# Patient Record
Sex: FEMALE | Race: WHITE | NOT HISPANIC OR LATINO | Employment: FULL TIME | URBAN - METROPOLITAN AREA
[De-identification: names, ages, dates, MRNs, and addresses within clinical notes are randomized per-mention and may not be internally consistent; named-entity substitution may affect disease eponyms.]

---

## 2017-02-22 ENCOUNTER — OFFICE VISIT (OUTPATIENT)
Dept: URGENT CARE | Facility: CLINIC | Age: 62
End: 2017-02-22
Payer: COMMERCIAL

## 2017-02-22 PROCEDURE — 99213 OFFICE O/P EST LOW 20 MIN: CPT

## 2021-04-08 DIAGNOSIS — Z23 ENCOUNTER FOR IMMUNIZATION: ICD-10-CM

## 2022-01-05 ENCOUNTER — TELEPHONE (OUTPATIENT)
Dept: NEUROLOGY | Facility: CLINIC | Age: 67
End: 2022-01-05
Payer: COMMERCIAL

## 2022-01-05 NOTE — TELEPHONE ENCOUNTER
Name: Asa Dukes   Phone: [phone]  Level/Region: lumbar  Referring: friend/patient of Dr. Schmidt's  MRI month/year: 1/2021   MRI location: [facility]  Other workup/treatment (surgery/EMG/injections): Yes, describe: Injection Lower back 2/2021, and 6/29/2021 Facet 3 holes bottom of spine done [facility] [place] with [facility] Specialist.    The patient was offered the next available appointment.  I informed her that we would collect outside records to facilitate her new patient appointment.

## 2022-01-18 ENCOUNTER — HOSPITAL ENCOUNTER (OUTPATIENT)
Dept: RADIOLOGY | Facility: HOSPITAL | Age: 67
Discharge: HOME | End: 2022-01-18
Attending: PHYSICIAN ASSISTANT
Payer: COMMERCIAL

## 2022-01-18 ENCOUNTER — OFFICE VISIT (OUTPATIENT)
Dept: NEUROSURGERY | Facility: CLINIC | Age: 67
End: 2022-01-18
Payer: COMMERCIAL

## 2022-01-18 VITALS — SYSTOLIC BLOOD PRESSURE: 122 MMHG | DIASTOLIC BLOOD PRESSURE: 80 MMHG | HEART RATE: 114 BPM | OXYGEN SATURATION: 98 %

## 2022-01-18 DIAGNOSIS — M47.816 LUMBAR SPONDYLOSIS: ICD-10-CM

## 2022-01-18 DIAGNOSIS — M47.816 LUMBAR SPONDYLOSIS: Primary | ICD-10-CM

## 2022-01-18 PROCEDURE — 99203 OFFICE O/P NEW LOW 30 MIN: CPT | Performed by: PHYSICIAN ASSISTANT

## 2022-01-18 PROCEDURE — 72114 X-RAY EXAM L-S SPINE BENDING: CPT

## 2022-01-18 RX ORDER — CYCLOBENZAPRINE HCL 5 MG
TABLET ORAL
COMMUNITY
Start: 2022-01-12

## 2022-01-18 RX ORDER — MELOXICAM 15 MG/1
15 TABLET ORAL DAILY PRN
COMMUNITY
Start: 2021-11-27

## 2022-01-18 RX ORDER — CLORAZEPATE DIPOTASSIUM 3.75 MG/1
3.75 TABLET ORAL
COMMUNITY
Start: 2021-12-27

## 2022-01-18 RX ORDER — METAXALONE 800 MG/1
800 TABLET ORAL 3 TIMES DAILY PRN
COMMUNITY
Start: 2022-01-14

## 2022-01-18 RX ORDER — OXYCODONE HYDROCHLORIDE 5 MG/1
5 TABLET ORAL
COMMUNITY
Start: 2021-12-08

## 2022-01-18 RX ORDER — GABAPENTIN 300 MG/1
CAPSULE ORAL
COMMUNITY
Start: 2021-12-29

## 2022-01-18 RX ORDER — TOPIRAMATE 25 MG/1
25 TABLET ORAL NIGHTLY
COMMUNITY
Start: 2021-12-27

## 2022-01-18 RX ORDER — MECLIZINE HYDROCHLORIDE 25 MG/1
TABLET ORAL
COMMUNITY
Start: 2022-01-12

## 2022-01-18 RX ORDER — ENALAPRIL MALEATE 10 MG/1
10 TABLET ORAL
COMMUNITY
Start: 2021-12-27

## 2022-01-18 RX ORDER — VENLAFAXINE HYDROCHLORIDE 37.5 MG/1
37.5 CAPSULE, EXTENDED RELEASE ORAL
COMMUNITY
Start: 2021-12-27

## 2022-01-18 RX ORDER — ENALAPRIL MALEATE 20 MG/1
TABLET ORAL
COMMUNITY
Start: 2022-01-12

## 2022-01-18 NOTE — PROGRESS NOTES
[facility]  Medical Office [address], [address]  [place] [address]  Phone: [phone]  Fax: [phone]        22      Re: Asa Dukes  : 1955      Chief Complaint:  Lumbosacral discomfort     History of Present Illness:   Asa Dukes is a 66 y.o. right handed female who presents for neurosurgical consultation for evaluation of her lumbosacral discomfort.    She has had lumbosacral discomfort since she had her left partial knee replacement in 2020. By November-2020 she started developing significant spasms in her low back. She does have extension of her pain into her bilateral gluteal regions but no further extension into the lower extremities. She said overall her pain has been worse in the right side. She does mention having a left knee discomfort that radiated around her knee but did not connect with her gluteal discomfort. This pain did improve with an injection.     Her pain vacillates between a 2 and 10 out of 10. Her average level of discomfort is a 8 out of 10.  Her pain is most significant when sitting. Standing helps her alleviate her pain to a degree.  She has associated tingling in her right anterior chin from her knee down into the dorsum of her foot. She had tried numerous conservative modalities. She has undergone physical therapy, numerous injections and oral medications. She takes skelaxin, oxycodone, and gabapentin for pain control.    She has had a variety of injections by Dr. Johnson. On 22 she had undergone a L3-L4 interlaminar epidural,. On 21 she had an L5-S1 interlaminar epidural. On 21 she had bilateral L3-S1 intra-articular facet joint injections. On  she had an interlaminar L2-L3 injection. She felt her facet injections in  helped her pain to the most significant degree and lasted approximately 4 months.     She is without additional features of  weakness, alterations of sensation, bladder or bowel dysfunction. She ambulates without the need for assistive device. She reports a moderate to severe level of impairment on her activities of daily living due to the aforementioned symptoms.     She has had two pregnancies with two natural child births    Medical History:  has no past medical history on file.    Surgical History:  has no past surgical history on file.    Family History: family history is not on file.  Family history non-contributory to neurological condition.    Social History:   Social History     Socioeconomic History   • Marital status:      Spouse name: Not on file   • Number of children: Not on file   • Years of education: Not on file   • Highest education level: Not on file   Occupational History   • Not on file   Tobacco Use   • Smoking status: Not on file   • Smokeless tobacco: Not on file   Substance and Sexual Activity   • Alcohol use: Not on file   • Drug use: Not on file   • Sexual activity: Not on file   Other Topics Concern   • Not on file   Social History Narrative   • Not on file     Social Determinants of Health     Financial Resource Strain: Not on file   Food Insecurity: Not on file   Transportation Needs: Not on file   Physical Activity: Not on file   Stress: Not on file   Social Connections: Not on file   Intimate Partner Violence: Not on file   Housing Stability: Not on file        Allergies: No Known Allergies    Medications:   Current Outpatient Medications   Medication Sig Dispense Refill   • clorazepate 3.75 mg tablet Take 3.75 mg by mouth once daily.     • cyclobenzaprine 5 mg tablet TAKE 1 TABLET BY MOUTH 3 TIMES DAILY as needed FOR MUSCLE SPASMS     • enalapril 10 mg tablet Take 10 mg by mouth once daily.     • enalapril 20 mg tablet TAKE 1 TABLET BY MOUTH BY MOUTH DAILY     • gabapentin 300 mg capsule TAKE 1 CAPSULE BY MOUTH EVERY DAY AT BEDTIME     • meclizine 25 mg tablet TAKE 1 TABLET BY MOUTH TWICE DAILY as  needed FOR dizziness     • meloxicam 15 mg tablet Take 15 mg by mouth daily as needed.     • metaxalone 800 mg tablet Take 800 mg by mouth 3 (three) times a day as needed.     • oxyCODONE 5 mg immediate release tablet Take 5 mg by mouth every 4 (four) hours.     • topiramate 25 mg tablet Take 25 mg by mouth nightly.     • venlafaxine XR 37.5 mg 24 hr capsule Take 37.5 mg by mouth 2 (two) times a day.       No current facility-administered medications for this visit.       Review of Systems: A 14 point review of systems was performed and aside from what is mentioned above is otherwise negative.    General physical examination:  The patient is well appearing female, sitting upright in her chair in no acute distress, she appears her stated age.  Her head is atraumatic, normocephalic. Her neck is supple without obvious adenopathy. Oral mucosa is moist. Her peripheral pulses are symmetric and palpable. Extremities without peripheral edema. Her breathing is normal and unlabored.     Vitals:    01/18/22 1309   BP: 122/80   Pulse: (!) 114   SpO2: 98%          Neurologic examination:  Mental status:  The patient is alert, attentive, and oriented. Speech is clear and fluent with good repetition, comprehension, and naming.  Remote and recent memory are normal as well as fund of knowledge.    Cranial nerves:  CN II: Visual fields are full to confrontation.  Pupils are equal and briskly reactive to light.   CN III, IV, VI: Extra-occular muscles are intact  CN V: Facial sensation is intact and equal in V1-V3 distributions bilaterally.   CN VII: Face is symmetric with normal eye closure and smile.  CN VIII: Hearing is normal to rubbing fingers  CN IX, X: Palate elevates symmetrically. Phonation is normal.  CN XI: Head turning and shoulder shrug are intact  CN XII: Tongue is midline with normal movements and no atrophy.    Motor:  There is no pronator drift.  Muscle bulk and tone are normal.    Deltoid Biceps Triceps Wrist ext Hand   Finger Spread Hip flexion Knee ext Dorsi-  flexion EHL Plantar Flexion   L 5 5 5 5 5 5 5 5 5 5 5   R 5 5 5 5 5 5 4+ PL 5 5 5 5     Reflexes:  Reflexes are 2+ and symmetric at the biceps, brachialis, triceps, patellar, and Achilli's. There is no Dumont's sign or ankle clonus.    Sensory:   Intact light touch, pinprick, and position sense, throughout all 4 extremities.    Coordination:  There is no dysmetria on finger-to-nose testing.  Romberg is absent.    Gait:  Gait is steady with normal steps.  Heel and toe walking are normal. Tandem gait is normal.    MSK: tenderness to palpation over the PSIS.  No pain with steps, minor discomfort with zena's maneuver bilaterally    Data Review:  X-rays of the lumbar spine without bending performed 1/12/22  There is moderate disc space narrowing at L3-L4 with vacuum disc. Severe disc space narrowing is present at L4-L5 with approximately 9 mm grade 1 anterolisthesis of L4 related to L5. There is mild narrowing of the disc space at L5-S1. No spondylolysis. Hypertrophic degenerative facet disease is present at L4-L5 and L5-S1. No fracture, subluxation, or bone lesion. Sacroiliac joints without erosion.    Impression: Severe degenerative disease at L4-L5 with grade 1 anterolisthesis. Degenerative disease is also present at L4-L5, L5-S1.          MR of the lumbar spine performed 3/19/21  Impression:   1. Grade 1 anterolisthesis of L4 over L5 with multifactorial moderate to severe spinal stenosis. There is neural foraminal narrowing and lateral recesses stenosis art this level, with abutent on bilateral exiting L4 nerve roots and impingement on the bilateral exiting L4 nerve roots and impingement on the bilateral traverse L5 nerve roots.  2. Right paracentral disc intrusion at L2-L3 impinges the traversing right L3 nerve root within the lateral recess.   Images were personally reviewed by myself and with the patient.    L1-L2: Disc bulge without substantial spinal canal  narrowing. There is mild left neural foraminal narrowing.    L2-L3: Approximately 3 mm retrolisthesis of L2 over L3, with disc bulge and a superimposed right paracentral disc protrusion. There is mild facet arthrosis and slight prominence of the ligamentum flavum. There is narrowing of the right lateral recess with impingement on the traversing right L3 nerve root there spinal canal is overall mildly narrowed. There is mild bilateral neural foraminal narrowing.    L3-L4: mild disc bulge, mild facet arthrosis and slight prominence of the ligamentum flavum, resulting in mild spinal canal narrowing. There is narrowing of the lateral recesses, greater on the left, with crowding of the traversing left L4 nerve root. There is mild to moderate bilateral neural foraminal narrowing.    L4-L5: approximately 7 mm anterolisthesis of L4 over L5, with disc bulge, advanced facet arthrosis and prominence of the ligamentum flavum. The spinal canal is moderately to severely narrowed. There is bilateral L5 lateral recess stenosis/impingement. There is moderate bilateral neural foraminal narrowing with abutment on the bilateral exiting L4 nerve roots.    L5-S1: Disc bulge and left sided facet arthrosis, without substantial spinal canal narrowing. No substantial neural foraminal narrowing.     Assessment and Plan:  In summary, Asa Dukes is a 66 y.o. female with progressive history of lumbosacral discomfort into gluteal region with associated numbness of her right anterior calf, dorsum of her foot. Her symptoms persist despite numerous conservative measures including physical therapy, injection based therapies in multimodal pain regimen so she was therefore referred to my attention.     Lumbar MR imaging demonstrates multilevel degenerative changes with a reported grade 1 anterolisthesis of L4 over L5.resulting in severe central and lateral recess stenosis. I have asked she obtain an upright lumbar x-rays with  flexion/extension to access for any dynamic instability.    The patient was counseled at length regarding natural history of degenerative lumbar, stenosis, radiculopathy, neurogenic claudication.  She was asked to continue with lifestyle modification, avoidance of excessive bending, twisting, overhead lifting.  In the interim should her symptomatology evolve or worsen, I have asked she return sooner for prompt reevaluation.    Thank you for referring Asa Dukes  to my attention.  Please feel free to contact me anytime if I can be of further assistance.    Lola Bundy PA-C     I spent 30 minutes on this date of service performing the following activities: obtaining history, performing examination, entering orders, documenting, preparing for visit, obtaining / reviewing records, providing counseling and education, independently reviewing study/studies, communicating results and coordinating care.

## 2022-01-21 ENCOUNTER — OFFICE VISIT (OUTPATIENT)
Dept: NEUROSURGERY | Facility: CLINIC | Age: 67
End: 2022-01-21
Payer: COMMERCIAL

## 2022-01-21 VITALS
HEART RATE: 84 BPM | DIASTOLIC BLOOD PRESSURE: 80 MMHG | OXYGEN SATURATION: 98 % | HEIGHT: 65 IN | BODY MASS INDEX: 31.99 KG/M2 | SYSTOLIC BLOOD PRESSURE: 128 MMHG | WEIGHT: 192 LBS

## 2022-01-21 DIAGNOSIS — M43.16 SPONDYLOLISTHESIS OF LUMBAR REGION: ICD-10-CM

## 2022-01-21 DIAGNOSIS — M47.816 LUMBAR SPONDYLOSIS: Primary | ICD-10-CM

## 2022-01-21 PROCEDURE — 99215 OFFICE O/P EST HI 40 MIN: CPT | Performed by: NEUROLOGICAL SURGERY

## 2022-01-21 PROCEDURE — 3008F BODY MASS INDEX DOCD: CPT | Performed by: NEUROLOGICAL SURGERY

## 2022-01-21 ASSESSMENT — PAIN SCALES - GENERAL: PAINLEVEL: 8

## 2022-01-21 NOTE — LETTER
2022     Flavio Bailey MD  [address]  [address]  [place] [facility] [id number]    Patient: Roseline Bray  YOB: 1955  Date of Visit: 2022      Dear Dr. Bailey:    Thank you for referring Roseline Bray to me for evaluation. Below are my notes for this consultation.    If you have questions, please do not hesitate to call me. I look forward to following your patient along with you.         Sincerely,        Gerry Schmidt MD        CC: Gerry Sy MD  2022  2:36 PM  Signed      [facility]  Medical Office [facility], [address]  [place] [address]  Phone: [phone]  Fax: [phone]        22      Re: Roseline Bray  : 1955      Chief Complaint:  Lumbosacral discomfort     History of Present Illness:   Roseline Bray is a 66 y.o. right handed female who presents in neurosurgical consultation for evaluation of her lumbosacral discomfort.  Her symptoms began in 2020 shortly after a left partial knee arthroplasty procedure.  At that time her pain was located principally in the lumbosacral region with extension into the gluteal, upper thigh area, more so on the right.  Her symptoms gradually progressed and she sought medical evaluation in early .    She was referred for multiple conservative modalities of care inclusive of lifestyle modification, medication management, rehabilitative exercise.  Her pain symptoms partially responded to these therapies.  Unfortunately she developed worsening right-sided radicular extension of pain into her posterolateral thigh, shin, dorsum of her foot.  MRI of the lumbosacral spine was obtained disclosing L4-L5 spondylolisthesis, stenosis.  She was referred for injection based procedures thereafter.  She underwent injections in April, , 2021.  She experienced only transient improvement.  Given her failure with  traditional modalities of care, she was referred to my attention in secondary consultation.    On interview today, she notes sharp stabbing lumbosacral pain which extends into the gluteal, proximal thigh region bilaterally.  Her pain will vacillate in severity between a 2-10 out of 10.  Her average level of discomfort is an 8 out of 10.  Postural activities can magnify her symptoms.  She has associated features of tingling in the right shin to the dorsum of her foot.  She however denies symptoms of south numbness, weakness, bowel, bladder, gait dysfunction.  She ambulates without the need for an assistive device.  She reports a moderate to severe level of impairment in her activities of daily living secondary to the aforementioned issues.      Medical History:  has no past medical history on file.    Surgical History:  has no past surgical history on file.    Family History: Family history non-contributory to neurological condition.    Social History:   Social History     Socioeconomic History   • Marital status:      Spouse name: None   • Number of children: None   • Years of education: None   • Highest education level: None   Occupational History   • None   Tobacco Use   • Smoking status: Never Smoker   • Smokeless tobacco: Never Used   Substance and Sexual Activity   • Alcohol use: None   • Drug use: None   • Sexual activity: None   Other Topics Concern   • None   Social History Narrative   • None     Social Determinants of Health     Financial Resource Strain: Not on file   Food Insecurity: Not on file   Transportation Needs: Not on file   Physical Activity: Not on file   Stress: Not on file   Social Connections: Not on file   Intimate Partner Violence: Not on file   Housing Stability: Not on file        Allergies: No Known Allergies    Medications:   Current Outpatient Medications   Medication Sig Dispense Refill   • clorazepate 3.75 mg tablet Take 3.75 mg by mouth once daily.     • cyclobenzaprine 5 mg  "tablet TAKE 1 TABLET BY MOUTH 3 TIMES DAILY as needed FOR MUSCLE SPASMS     • enalapril 10 mg tablet Take 10 mg by mouth once daily.     • enalapril 20 mg tablet TAKE 1 TABLET BY MOUTH BY MOUTH DAILY     • gabapentin 300 mg capsule TAKE 1 CAPSULE BY MOUTH EVERY DAY AT BEDTIME     • meclizine 25 mg tablet TAKE 1 TABLET BY MOUTH TWICE DAILY as needed FOR dizziness     • meloxicam 15 mg tablet Take 15 mg by mouth daily as needed.     • metaxalone 800 mg tablet Take 800 mg by mouth 3 (three) times a day as needed.     • oxyCODONE 5 mg immediate release tablet Take 5 mg by mouth every 4 (four) hours.     • topiramate 25 mg tablet Take 25 mg by mouth nightly.     • venlafaxine XR 37.5 mg 24 hr capsule Take 37.5 mg by mouth 2 (two) times a day.       No current facility-administered medications for this visit.       Review of Systems:   A 14 point review of systems was performed and aside from what is mentioned above is otherwise negative.    General physical examination:  The patient is well appearing female, sitting upright in her chair in no acute distress, she appears her stated age.  Her head is atraumatic, normocephalic. Her neck is supple without obvious adenopathy. Oral mucosa is moist. Her peripheral pulses are symmetric and palpable. Extremities without peripheral edema. Her breathing is normal and unlabored.     Vitals:    01/21/22 1102   BP: 128/80   BP Location: Left upper arm   Patient Position: Sitting   Pulse: 84   SpO2: 98%   Weight: 87.1 kg (192 lb)   Height: 1.651 m (5' 5\")          Neurologic examination:  Mental status:  The patient is alert, attentive, and oriented. Speech is clear and fluent with good repetition, comprehension, and naming.  Remote and recent memory are normal as well as fund of knowledge.    Cranial nerves:  CN II: Visual fields are full to confrontation.  Pupils are equal and briskly reactive to light.   CN III, IV, VI: Extra-occular muscles are intact  CN V: Facial sensation is " intact and equal in V1-V3 distributions bilaterally.   CN VII: Face is symmetric with normal eye closure and smile.  CN VIII: Hearing is normal to rubbing fingers  CN IX, X: Palate elevates symmetrically. Phonation is normal.  CN XI: Head turning and shoulder shrug are intact  CN XII: Tongue is midline with normal movements and no atrophy.    Motor:  There is no pronator drift.  Muscle bulk and tone are normal.    Deltoid Biceps Triceps Wrist ext Hand  Finger Spread Hip flexion Knee ext Dorsi-  flexion EHL Plantar Flexion   L 5 5 5 5 5 5 5 5 5 5 5   R 5 5 5 5 5 5 4+ PL 5 5 5 5     Reflexes:  Reflexes are 2+ and symmetric at the biceps, brachialis, triceps, patellar, and Achilli's. There is no Dumont's sign or ankle clonus.    Sensory:   Intact light touch, pinprick, and position sense, throughout all 4 extremities.    Coordination:  There is no dysmetria on finger-to-nose testing.  Romberg is absent.    Gait:  Gait is steady with normal steps.  Heel and toe walking are normal. Tandem gait is normal.    MSK: tenderness to palpation over the PSIS.  No pain with steps, minor discomfort with zena's maneuver bilaterally    Data Review:  X-RAY LUMBAR SPINE COMPLETE WITH BENDING (01/18/2022)    AP, lateral, bilateral oblique, and cone down views of the lumbar spine  demonstrate mild scoliosis of the lumbar spine. There are multilevel facet  hypertrophy and disc disease throughout the lumbar spine. There is grade 1  anterolisthesis of L4 on L5. On the flexion and extension views, there is  persistent grade 1 anterolisthesis of L4 on L5 without significant change  compared to prior study. No definite evidence of acute fracture or dislocation.  Impression: IMPRESSION:  Grade 1 anterolisthesis of L4 on L5 with multilevel disc disease and facet  hypertrophy. No acute fracture or dislocation.     X-rays of the lumbar spine without bending performed 1/12/22  There is moderate disc space narrowing at L3-L4 with vacuum disc.  Severe disc space narrowing is present at L4-L5 with approximately 9 mm grade 1 anterolisthesis of L4 related to L5. There is mild narrowing of the disc space at L5-S1. No spondylolysis. Hypertrophic degenerative facet disease is present at L4-L5 and L5-S1. No fracture, subluxation, or bone lesion. Sacroiliac joints without erosion.    Impression: Severe degenerative disease at L4-L5 with grade 1 anterolisthesis. Degenerative disease is also present at L4-L5, L5-S1.          MR of the lumbar spine performed 3/19/21  Impression:   1. Grade 1 anterolisthesis of L4 over L5 with multifactorial moderate to severe spinal stenosis. There is neural foraminal narrowing and lateral recesses stenosis art this level, with abutent on bilateral exiting L4 nerve roots and impingement on the bilateral exiting L4 nerve roots and impingement on the bilateral traverse L5 nerve roots.  2. Right paracentral disc intrusion at L2-L3 impinges the traversing right L3 nerve root within the lateral recess.   Images were personally reviewed by myself and with the patient.    L1-L2: Disc bulge without substantial spinal canal narrowing. There is mild left neural foraminal narrowing.    L2-L3: Approximately 3 mm retrolisthesis of L2 over L3, with disc bulge and a superimposed right paracentral disc protrusion. There is mild facet arthrosis and slight prominence of the ligamentum flavum. There is narrowing of the right lateral recess with impingement on the traversing right L3 nerve root there spinal canal is overall mildly narrowed. There is mild bilateral neural foraminal narrowing.    L3-L4: mild disc bulge, mild facet arthrosis and slight prominence of the ligamentum flavum, resulting in mild spinal canal narrowing. There is narrowing of the lateral recesses, greater on the left, with crowding of the traversing left L4 nerve root. There is mild to moderate bilateral neural foraminal narrowing.    L4-L5: approximately 7 mm anterolisthesis of  L4 over L5, with disc bulge, advanced facet arthrosis and prominence of the ligamentum flavum. The spinal canal is moderately to severely narrowed. There is bilateral L5 lateral recess stenosis/impingement. There is moderate bilateral neural foraminal narrowing with abutment on the bilateral exiting L4 nerve roots.    L5-S1: Disc bulge and left sided facet arthrosis, without substantial spinal canal narrowing. No substantial neural foraminal narrowing.     Assessment and Plan:    In summary, Roseline Bray is a 66 y.o. female with progressive history of lumbosacral, gluteal, right greater than left posterolateral thigh, foot discomfort.  Her symptoms are suggestive of L4, L5 radiculopathy.  She has concordant radiographic findings of high-grade L4-L5 stenosis on the basis of a grade 1-2 spondylolisthesis.  Upright flexion, extension x-rays of the lumbosacral spine failed to disclose hypermobility.    Her symptoms have escalated over the past 1 year despite traditional modalities of care.  The natural history of lumbar spondylosis, spondylolisthesis, stenosis was elaborated in great detail.  We have recommended she continue with lifestyle modification, core strengthening, symptomatic medication with anti-inflammatories, muscle relaxants, neuropathic medications (gabapentin, Lyrica), avoidance of excessive bending, twisting, or lifting, unrestrained forces across her axial skeleton.  Thereafter we look forward to seeing her return in follow-up consultation in 3 months time to reassess her progress.    Surgical intervention would be reserved for failure with the aforementioned treatment options. In the interim should her symptomatology evolve or worsen, I have asked she return sooner for prompt reevaluation.    Thank you for referring Roseline Bray  to my attention.  Please feel free to contact me anytime if I can be of further assistance.      I, Aaron Ulrich PA-C, am scribing for, and in the presence of  Gerry Schmidt MD.    I, Gerry Schmidt MD, personally performed the services described in this documentation as scribed by Aaron Ulrich PA-C  in my presence, and it is accurate and complete.       I spent 62 minutes on this date of service performing the following activities: obtaining history, performing examination, entering orders, documenting, preparing for visit, obtaining / reviewing records, providing counseling and education, independently reviewing study/studies, communicating results and coordinating care.

## 2022-01-21 NOTE — PROGRESS NOTES
[facility]  Medical Office [address], [address]  [place] [address]  Phone: [phone]  Fax: [phone]        22      Re: Roseline Bray  : 1955      Chief Complaint:  Lumbosacral discomfort     History of Present Illness:   Roseline Bray is a 66 y.o. right handed female who presents in neurosurgical consultation for evaluation of her lumbosacral discomfort.  Her symptoms began in 2020 shortly after a left partial knee arthroplasty procedure.  At that time her pain was located principally in the lumbosacral region with extension into the gluteal, upper thigh area, more so on the right.  Her symptoms gradually progressed and she sought medical evaluation in early .    She was referred for multiple conservative modalities of care inclusive of lifestyle modification, medication management, rehabilitative exercise.  Her pain symptoms partially responded to these therapies.  Unfortunately she developed worsening right-sided radicular extension of pain into her posterolateral thigh, shin, dorsum of her foot.  MRI of the lumbosacral spine was obtained disclosing L4-L5 spondylolisthesis, stenosis.  She was referred for injection based procedures thereafter.  She underwent injections in April, , 2021.  She experienced only transient improvement.  Given her failure with traditional modalities of care, she was referred to my attention in secondary consultation.    On interview today, she notes sharp stabbing lumbosacral pain which extends into the gluteal, proximal thigh region bilaterally.  Her pain will vacillate in severity between a 2-10 out of 10.  Her average level of discomfort is an 8 out of 10.  Postural activities can magnify her symptoms.  She has associated features of tingling in the right shin to the dorsum of her foot.  She however denies symptoms of south numbness, weakness, bowel, bladder, gait  dysfunction.  She ambulates without the need for an assistive device.  She reports a moderate to severe level of impairment in her activities of daily living secondary to the aforementioned issues.      Medical History:  has no past medical history on file.    Surgical History:  has no past surgical history on file.    Family History: Family history non-contributory to neurological condition.    Social History:   Social History     Socioeconomic History   • Marital status:      Spouse name: None   • Number of children: None   • Years of education: None   • Highest education level: None   Occupational History   • None   Tobacco Use   • Smoking status: Never Smoker   • Smokeless tobacco: Never Used   Substance and Sexual Activity   • Alcohol use: None   • Drug use: None   • Sexual activity: None   Other Topics Concern   • None   Social History Narrative   • None     Social Determinants of Health     Financial Resource Strain: Not on file   Food Insecurity: Not on file   Transportation Needs: Not on file   Physical Activity: Not on file   Stress: Not on file   Social Connections: Not on file   Intimate Partner Violence: Not on file   Housing Stability: Not on file        Allergies: No Known Allergies    Medications:   Current Outpatient Medications   Medication Sig Dispense Refill   • clorazepate 3.75 mg tablet Take 3.75 mg by mouth once daily.     • cyclobenzaprine 5 mg tablet TAKE 1 TABLET BY MOUTH 3 TIMES DAILY as needed FOR MUSCLE SPASMS     • enalapril 10 mg tablet Take 10 mg by mouth once daily.     • enalapril 20 mg tablet TAKE 1 TABLET BY MOUTH BY MOUTH DAILY     • gabapentin 300 mg capsule TAKE 1 CAPSULE BY MOUTH EVERY DAY AT BEDTIME     • meclizine 25 mg tablet TAKE 1 TABLET BY MOUTH TWICE DAILY as needed FOR dizziness     • meloxicam 15 mg tablet Take 15 mg by mouth daily as needed.     • metaxalone 800 mg tablet Take 800 mg by mouth 3 (three) times a day as needed.     • oxyCODONE 5 mg immediate  "release tablet Take 5 mg by mouth every 4 (four) hours.     • topiramate 25 mg tablet Take 25 mg by mouth nightly.     • venlafaxine XR 37.5 mg 24 hr capsule Take 37.5 mg by mouth 2 (two) times a day.       No current facility-administered medications for this visit.       Review of Systems:   A 14 point review of systems was performed and aside from what is mentioned above is otherwise negative.    General physical examination:  The patient is well appearing female, sitting upright in her chair in no acute distress, she appears her stated age.  Her head is atraumatic, normocephalic. Her neck is supple without obvious adenopathy. Oral mucosa is moist. Her peripheral pulses are symmetric and palpable. Extremities without peripheral edema. Her breathing is normal and unlabored.     Vitals:    01/21/22 1102   BP: 128/80   BP Location: Left upper arm   Patient Position: Sitting   Pulse: 84   SpO2: 98%   Weight: 87.1 kg (192 lb)   Height: 1.651 m (5' 5\")          Neurologic examination:  Mental status:  The patient is alert, attentive, and oriented. Speech is clear and fluent with good repetition, comprehension, and naming.  Remote and recent memory are normal as well as fund of knowledge.    Cranial nerves:  CN II: Visual fields are full to confrontation.  Pupils are equal and briskly reactive to light.   CN III, IV, VI: Extra-occular muscles are intact  CN V: Facial sensation is intact and equal in V1-V3 distributions bilaterally.   CN VII: Face is symmetric with normal eye closure and smile.  CN VIII: Hearing is normal to rubbing fingers  CN IX, X: Palate elevates symmetrically. Phonation is normal.  CN XI: Head turning and shoulder shrug are intact  CN XII: Tongue is midline with normal movements and no atrophy.    Motor:  There is no pronator drift.  Muscle bulk and tone are normal.    Deltoid Biceps Triceps Wrist ext Hand  Finger Spread Hip flexion Knee ext Dorsi-  flexion EHL Plantar Flexion   L 5 5 5 5 5 5 5 " 5 5 5 5   R 5 5 5 5 5 5 4+ PL 5 5 5 5     Reflexes:  Reflexes are 2+ and symmetric at the biceps, brachialis, triceps, patellar, and Achilli's. There is no Dumont's sign or ankle clonus.    Sensory:   Intact light touch, pinprick, and position sense, throughout all 4 extremities.    Coordination:  There is no dysmetria on finger-to-nose testing.  Romberg is absent.    Gait:  Gait is steady with normal steps.  Heel and toe walking are normal. Tandem gait is normal.    MSK: tenderness to palpation over the PSIS.  No pain with steps, minor discomfort with zena's maneuver bilaterally    Data Review:  X-RAY LUMBAR SPINE COMPLETE WITH BENDING (01/18/2022)    AP, lateral, bilateral oblique, and cone down views of the lumbar spine  demonstrate mild scoliosis of the lumbar spine. There are multilevel facet  hypertrophy and disc disease throughout the lumbar spine. There is grade 1  anterolisthesis of L4 on L5. On the flexion and extension views, there is  persistent grade 1 anterolisthesis of L4 on L5 without significant change  compared to prior study. No definite evidence of acute fracture or dislocation.  Impression: IMPRESSION:  Grade 1 anterolisthesis of L4 on L5 with multilevel disc disease and facet  hypertrophy. No acute fracture or dislocation.     X-rays of the lumbar spine without bending performed 1/12/22  There is moderate disc space narrowing at L3-L4 with vacuum disc. Severe disc space narrowing is present at L4-L5 with approximately 9 mm grade 1 anterolisthesis of L4 related to L5. There is mild narrowing of the disc space at L5-S1. No spondylolysis. Hypertrophic degenerative facet disease is present at L4-L5 and L5-S1. No fracture, subluxation, or bone lesion. Sacroiliac joints without erosion.    Impression: Severe degenerative disease at L4-L5 with grade 1 anterolisthesis. Degenerative disease is also present at L4-L5, L5-S1.          MR of the lumbar spine performed 3/19/21  Impression:   1. Grade 1  anterolisthesis of L4 over L5 with multifactorial moderate to severe spinal stenosis. There is neural foraminal narrowing and lateral recesses stenosis art this level, with abutent on bilateral exiting L4 nerve roots and impingement on the bilateral exiting L4 nerve roots and impingement on the bilateral traverse L5 nerve roots.  2. Right paracentral disc intrusion at L2-L3 impinges the traversing right L3 nerve root within the lateral recess.   Images were personally reviewed by myself and with the patient.    L1-L2: Disc bulge without substantial spinal canal narrowing. There is mild left neural foraminal narrowing.    L2-L3: Approximately 3 mm retrolisthesis of L2 over L3, with disc bulge and a superimposed right paracentral disc protrusion. There is mild facet arthrosis and slight prominence of the ligamentum flavum. There is narrowing of the right lateral recess with impingement on the traversing right L3 nerve root there spinal canal is overall mildly narrowed. There is mild bilateral neural foraminal narrowing.    L3-L4: mild disc bulge, mild facet arthrosis and slight prominence of the ligamentum flavum, resulting in mild spinal canal narrowing. There is narrowing of the lateral recesses, greater on the left, with crowding of the traversing left L4 nerve root. There is mild to moderate bilateral neural foraminal narrowing.    L4-L5: approximately 7 mm anterolisthesis of L4 over L5, with disc bulge, advanced facet arthrosis and prominence of the ligamentum flavum. The spinal canal is moderately to severely narrowed. There is bilateral L5 lateral recess stenosis/impingement. There is moderate bilateral neural foraminal narrowing with abutment on the bilateral exiting L4 nerve roots.    L5-S1: Disc bulge and left sided facet arthrosis, without substantial spinal canal narrowing. No substantial neural foraminal narrowing.     Assessment and Plan:    In summary, Roseline Bray is a 66 y.o. female with  progressive history of lumbosacral, gluteal, right greater than left posterolateral thigh, foot discomfort.  Her symptoms are suggestive of L4, L5 radiculopathy.  She has concordant radiographic findings of high-grade L4-L5 stenosis on the basis of a grade 1-2 spondylolisthesis.  Upright flexion, extension x-rays of the lumbosacral spine failed to disclose hypermobility.    Her symptoms have escalated over the past 1 year despite traditional modalities of care.  The natural history of lumbar spondylosis, spondylolisthesis, stenosis was elaborated in great detail.  We have recommended she continue with lifestyle modification, core strengthening, symptomatic medication with anti-inflammatories, muscle relaxants, neuropathic medications (gabapentin, Lyrica), avoidance of excessive bending, twisting, or lifting, unrestrained forces across her axial skeleton.  Thereafter we look forward to seeing her return in follow-up consultation in 3 months time to reassess her progress.    Surgical intervention would be reserved for failure with the aforementioned treatment options. In the interim should her symptomatology evolve or worsen, I have asked she return sooner for prompt reevaluation.    Thank you for referring Roseline Bray  to my attention.  Please feel free to contact me anytime if I can be of further assistance.      I, Aaron Ulrich PA-C, am scribing for, and in the presence of Gerry Schmidt MD.    I, Gerry Schmidt MD, personally performed the services described in this documentation as scribed by Aaron Ulrich PA-C  in my presence, and it is accurate and complete.       I spent 62 minutes on this date of service performing the following activities: obtaining history, performing examination, entering orders, documenting, preparing for visit, obtaining / reviewing records, providing counseling and education, independently reviewing study/studies, communicating results and coordinating care.

## 2022-04-20 ENCOUNTER — OFFICE VISIT (OUTPATIENT)
Dept: NEUROSURGERY | Facility: CLINIC | Age: 67
End: 2022-04-20
Payer: COMMERCIAL

## 2022-04-20 VITALS
HEIGHT: 65 IN | SYSTOLIC BLOOD PRESSURE: 142 MMHG | DIASTOLIC BLOOD PRESSURE: 94 MMHG | RESPIRATION RATE: 15 BRPM | WEIGHT: 186 LBS | BODY MASS INDEX: 30.99 KG/M2 | OXYGEN SATURATION: 94 % | HEART RATE: 92 BPM

## 2022-04-20 DIAGNOSIS — M43.16 SPONDYLOLISTHESIS OF LUMBAR REGION: ICD-10-CM

## 2022-04-20 DIAGNOSIS — M47.816 LUMBAR SPONDYLOSIS: Primary | ICD-10-CM

## 2022-04-20 PROCEDURE — 99214 OFFICE O/P EST MOD 30 MIN: CPT | Performed by: NEUROLOGICAL SURGERY

## 2022-04-20 PROCEDURE — 3008F BODY MASS INDEX DOCD: CPT | Performed by: NEUROLOGICAL SURGERY

## 2022-04-20 ASSESSMENT — PAIN SCALES - GENERAL: PAINLEVEL: 4

## 2022-04-20 NOTE — PROGRESS NOTES
[facility]  Medical Office [address], [address]  [place] [address]  Phone: [phone]  Fax: [phone]        22      Re: Roseline Bray  : 1955      Chief Complaint:  Lumbosacral discomfort     History of Present Illness:   Roseline Bray is a 66 y.o. right handed female who presents in follow up neurosurgical consultation for evaluation of her lumbosacral discomfort.  Her symptoms began in 2020 shortly after a left partial knee arthroplasty procedure.  At that time her pain was located principally in the lumbosacral region with extension into the gluteal, upper thigh area, more so on the right.  Her symptoms gradually progressed and she sought medical evaluation in early .    She was referred for multiple conservative modalities of care inclusive of lifestyle modification, medication management, rehabilitative exercise.  Her pain symptoms partially responded to these therapies.  Unfortunately she developed worsening right-sided radicular extension of pain into her posterolateral thigh, shin, dorsum of her foot.  MRI of the lumbosacral spine was obtained disclosing L4-L5 spondylolisthesis, stenosis.  She was referred for injection based procedures thereafter.  She underwent injections in April, , 2021.  She experienced only transient improvement.  Given her failure with traditional modalities of care, she was referred to my attention in secondary consultation.    On interview today, she notes sharp stabbing lumbosacral pain which extends into the gluteal, proximal thigh region bilaterally.  Her pain will vacillate in severity between a 3 to 8 out of 10.  Her average level of discomfort is an 6 out of 10.  Postural activities can magnify her symptoms.  She has associated features of tingling in the right shin to the dorsum of her foot. She has been undergoing physical therapy for many weeks with mild  residual improvement. She continues with gabapentin nightly and flexeril as needed.  She however denies symptoms of south numbness, weakness, bowel, bladder, gait dysfunction.  She ambulates without the need for an assistive device.  She reports a moderate to severe level of impairment in her activities of daily living secondary to the aforementioned issues.      Medical History:  has no past medical history on file.    Surgical History:  has no past surgical history on file.    Family History: Family history non-contributory to neurological condition.    Social History:   Social History     Socioeconomic History   • Marital status:    Tobacco Use   • Smoking status: Never Smoker   • Smokeless tobacco: Never Used        Allergies: No Known Allergies    Medications:   Current Outpatient Medications   Medication Sig Dispense Refill   • clorazepate 3.75 mg tablet Take 3.75 mg by mouth once daily.     • cyclobenzaprine 5 mg tablet TAKE 1 TABLET BY MOUTH 3 TIMES DAILY as needed FOR MUSCLE SPASMS     • enalapril 10 mg tablet Take 10 mg by mouth once daily.     • enalapril 20 mg tablet TAKE 1 TABLET BY MOUTH BY MOUTH DAILY     • gabapentin 300 mg capsule TAKE 1 CAPSULE BY MOUTH EVERY DAY AT BEDTIME     • meclizine 25 mg tablet TAKE 1 TABLET BY MOUTH TWICE DAILY as needed FOR dizziness     • meloxicam 15 mg tablet Take 15 mg by mouth daily as needed.     • metaxalone 800 mg tablet Take 800 mg by mouth 3 (three) times a day as needed.     • oxyCODONE 5 mg immediate release tablet Take 5 mg by mouth every 4 (four) hours.     • topiramate 25 mg tablet Take 25 mg by mouth nightly.     • venlafaxine XR 37.5 mg 24 hr capsule Take 37.5 mg by mouth 2 (two) times a day.       No current facility-administered medications for this visit.       Review of Systems:   A 14 point review of systems was performed and aside from what is mentioned above is otherwise negative.    General physical examination:  The patient is well  "appearing female, sitting upright in her chair in no acute distress, she appears her stated age.  Her head is atraumatic, normocephalic. Her neck is supple without obvious adenopathy. Oral mucosa is moist. Her peripheral pulses are symmetric and palpable. Extremities without peripheral edema. Her breathing is normal and unlabored.     Vitals:    04/20/22 1436   BP: (!) 142/94   BP Location: Left upper arm   Patient Position: Sitting   Pulse: 92   Resp: 15   SpO2: 94%   Weight: 84.4 kg (186 lb)   Height: 1.651 m (5' 5\")     Neurologic examination:  Mental status:  The patient is alert, attentive, and oriented. Speech is clear and fluent with good repetition, comprehension, and naming.  Remote and recent memory are normal as well as fund of knowledge.    Cranial nerves:  CN II: Visual fields are full to confrontation.  Pupils are equal and briskly reactive to light.   CN III, IV, VI: Extra-occular muscles are intact  CN V: Facial sensation is intact and equal in V1-V3 distributions bilaterally.   CN VII: Face is symmetric with normal eye closure and smile.  CN VIII: Hearing is normal to rubbing fingers  CN IX, X: Palate elevates symmetrically. Phonation is normal.  CN XI: Head turning and shoulder shrug are intact  CN XII: Tongue is midline with normal movements and no atrophy.    Motor:  There is no pronator drift.  Muscle bulk and tone are normal.    Deltoid Biceps Triceps Wrist ext Hand  Finger Spread Hip flexion Knee ext Dorsi-  flexion EHL Plantar Flexion   L 5 5 5 5 5 5 5 5 5 5 5   R 5 5 5 5 5 5 4+ PL 5 5 5 5     Reflexes:  Reflexes are 2+ and symmetric at the biceps, brachialis, triceps, patellar, and Achilli's. There is no Dumont's sign or ankle clonus.    Sensory:   Intact light touch, pinprick, and position sense, throughout all 4 extremities.    Coordination:  There is no dysmetria on finger-to-nose testing.  Romberg is absent.    Gait:  Gait is steady with normal steps.  Heel and toe walking are " normal. Tandem gait is normal.    MSK: tenderness to palpation over the PSIS.  No pain with steps, minor discomfort with zena's maneuver bilaterally    Data Review:  X-RAY LUMBAR SPINE COMPLETE WITH BENDING (01/18/2022)    AP, lateral, bilateral oblique, and cone down views of the lumbar spine demonstrate mild scoliosis of the lumbar spine. There are multilevel facet  hypertrophy and disc disease throughout the lumbar spine. There is grade 1 anterolisthesis of L4 on L5. On the flexion and extension views, there is persistent grade 1 anterolisthesis of L4 on L5 without significant change compared to prior study. No definite evidence of acute fracture or dislocation.     IMPRESSION:  Grade 1 anterolisthesis of L4 on L5 with multilevel disc disease and facet hypertrophy. No acute fracture or dislocation.     X-rays of the lumbar spine without bending performed 1/12/22  There is moderate disc space narrowing at L3-L4 with vacuum disc. Severe disc space narrowing is present at L4-L5 with approximately 9 mm grade 1 anterolisthesis of L4 related to L5. There is mild narrowing of the disc space at L5-S1. No spondylolysis. Hypertrophic degenerative facet disease is present at L4-L5 and L5-S1. No fracture, subluxation, or bone lesion. Sacroiliac joints without erosion.    Impression: Severe degenerative disease at L4-L5 with grade 1 anterolisthesis. Degenerative disease is also present at L4-L5, L5-S1.          MR of the lumbar spine performed 3/19/21  Impression:   1. Grade 1 anterolisthesis of L4 over L5 with multifactorial moderate to severe spinal stenosis. There is neural foraminal narrowing and lateral recesses stenosis art this level, with abutent on bilateral exiting L4 nerve roots and impingement on the bilateral exiting L4 nerve roots and impingement on the bilateral traverse L5 nerve roots.  2. Right paracentral disc intrusion at L2-L3 impinges the traversing right L3 nerve root within the lateral recess.   Images  were personally reviewed by myself and with the patient.    L1-L2: Disc bulge without substantial spinal canal narrowing. There is mild left neural foraminal narrowing.    L2-L3: Approximately 3 mm retrolisthesis of L2 over L3, with disc bulge and a superimposed right paracentral disc protrusion. There is mild facet arthrosis and slight prominence of the ligamentum flavum. There is narrowing of the right lateral recess with impingement on the traversing right L3 nerve root there spinal canal is overall mildly narrowed. There is mild bilateral neural foraminal narrowing.    L3-L4: mild disc bulge, mild facet arthrosis and slight prominence of the ligamentum flavum, resulting in mild spinal canal narrowing. There is narrowing of the lateral recesses, greater on the left, with crowding of the traversing left L4 nerve root. There is mild to moderate bilateral neural foraminal narrowing.    L4-L5: approximately 7 mm anterolisthesis of L4 over L5, with disc bulge, advanced facet arthrosis and prominence of the ligamentum flavum. The spinal canal is moderately to severely narrowed. There is bilateral L5 lateral recess stenosis/impingement. There is moderate bilateral neural foraminal narrowing with abutment on the bilateral exiting L4 nerve roots.    L5-S1: Disc bulge and left sided facet arthrosis, without substantial spinal canal narrowing. No substantial neural foraminal narrowing.     Assessment and Plan:    In summary, Roseline Bray is a 66 y.o. female with progressive history of lumbosacral, gluteal, right greater than left posterolateral thigh, foot discomfort.  Her symptoms are suggestive of L4, L5 radiculopathy.  She has concordant radiographic findings of high-grade L4-L5 stenosis on the basis of a grade 1-2 spondylolisthesis.  Upright flexion, extension x-rays of the lumbosacral spine failed to disclose hypermobility.    Her symptoms have escalated over the past 1 year despite traditional modalities of  care.  She has experienced some improvement with lifestyle modification, medication management as of late.  She wishes to defer on consideration of surgery at present.  This is reasonable given her high level of performance, benign neurologic exam.      The natural history of lumbar spondylosis, spondylolisthesis, stenosis was elaborated in great detail.  We have recommended she continue with lifestyle modification, core strengthening, symptomatic medication with anti-inflammatories, muscle relaxants, neuropathic medications (gabapentin, Lyrica), avoidance of excessive bending, twisting, or lifting, unrestrained forces across her axial skeleton.  Thereafter we look forward to seeing her return in follow-up consultation in 3 - 6 months time to reassess her progress.    Surgical intervention would be reserved for failure with the aforementioned treatment options. In the interim should her symptomatology evolve or worsen, I have asked she return sooner for prompt reevaluation.    Thank you for referring Roseline Bray  to my attention.  Please feel free to contact me anytime if I can be of further assistance.      I, Lola Bundy PA-C, am scribing for, and in the presence of Gerry Schmidt MD.    I, Gerry Schmidt MD, personally performed the services described in this documentation as scribed by Aaron Ulrich PA-C  in my presence, and it is accurate and complete.       I spent 30 minutes on this date of service performing the following activities: obtaining history, performing examination, entering orders, documenting, preparing for visit, obtaining / reviewing records, providing counseling and education, independently reviewing study/studies, communicating results and coordinating care.

## 2022-04-20 NOTE — LETTER
2022     Flavio Bailey MD  [address] [facility]  [address]  [facility] [address]    Patient: Roseline Bray  YOB: 1955  Date of Visit: 2022      Dear Dr. Bailey:    Thank you for referring Roseline Bray to me for evaluation. Below are my notes for this consultation.    If you have questions, please do not hesitate to call me. I look forward to following your patient along with you.         Sincerely,        Gerry Schmidt MD        CC: Gerry Sy MD  2022  4:38 PM  Signed      [facility]  Medical Office [facility], [address]  [place] [address]  Phone: [phone]  Fax: [phone]        22      Re: Roseline Bray  : 1955      Chief Complaint:  Lumbosacral discomfort     History of Present Illness:   Roseline Bray is a 66 y.o. right handed female who presents in follow up neurosurgical consultation for evaluation of her lumbosacral discomfort.  Her symptoms began in 2020 shortly after a left partial knee arthroplasty procedure.  At that time her pain was located principally in the lumbosacral region with extension into the gluteal, upper thigh area, more so on the right.  Her symptoms gradually progressed and she sought medical evaluation in early .    She was referred for multiple conservative modalities of care inclusive of lifestyle modification, medication management, rehabilitative exercise.  Her pain symptoms partially responded to these therapies.  Unfortunately she developed worsening right-sided radicular extension of pain into her posterolateral thigh, shin, dorsum of her foot.  MRI of the lumbosacral spine was obtained disclosing L4-L5 spondylolisthesis, stenosis.  She was referred for injection based procedures thereafter.  She underwent injections in April, , 2021.  She experienced only transient improvement.  Given her failure with  traditional modalities of care, she was referred to my attention in secondary consultation.    On interview today, she notes sharp stabbing lumbosacral pain which extends into the gluteal, proximal thigh region bilaterally.  Her pain will vacillate in severity between a 3 to 8 out of 10.  Her average level of discomfort is an 6 out of 10.  Postural activities can magnify her symptoms.  She has associated features of tingling in the right shin to the dorsum of her foot. She has been undergoing physical therapy for many weeks with mild residual improvement. She continues with gabapentin nightly and flexeril as needed.  She however denies symptoms of south numbness, weakness, bowel, bladder, gait dysfunction.  She ambulates without the need for an assistive device.  She reports a moderate to severe level of impairment in her activities of daily living secondary to the aforementioned issues.      Medical History:  has no past medical history on file.    Surgical History:  has no past surgical history on file.    Family History: Family history non-contributory to neurological condition.    Social History:   Social History     Socioeconomic History   • Marital status:    Tobacco Use   • Smoking status: Never Smoker   • Smokeless tobacco: Never Used        Allergies: No Known Allergies    Medications:   Current Outpatient Medications   Medication Sig Dispense Refill   • clorazepate 3.75 mg tablet Take 3.75 mg by mouth once daily.     • cyclobenzaprine 5 mg tablet TAKE 1 TABLET BY MOUTH 3 TIMES DAILY as needed FOR MUSCLE SPASMS     • enalapril 10 mg tablet Take 10 mg by mouth once daily.     • enalapril 20 mg tablet TAKE 1 TABLET BY MOUTH BY MOUTH DAILY     • gabapentin 300 mg capsule TAKE 1 CAPSULE BY MOUTH EVERY DAY AT BEDTIME     • meclizine 25 mg tablet TAKE 1 TABLET BY MOUTH TWICE DAILY as needed FOR dizziness     • meloxicam 15 mg tablet Take 15 mg by mouth daily as needed.     • metaxalone 800 mg tablet Take  "800 mg by mouth 3 (three) times a day as needed.     • oxyCODONE 5 mg immediate release tablet Take 5 mg by mouth every 4 (four) hours.     • topiramate 25 mg tablet Take 25 mg by mouth nightly.     • venlafaxine XR 37.5 mg 24 hr capsule Take 37.5 mg by mouth 2 (two) times a day.       No current facility-administered medications for this visit.       Review of Systems:   A 14 point review of systems was performed and aside from what is mentioned above is otherwise negative.    General physical examination:  The patient is well appearing female, sitting upright in her chair in no acute distress, she appears her stated age.  Her head is atraumatic, normocephalic. Her neck is supple without obvious adenopathy. Oral mucosa is moist. Her peripheral pulses are symmetric and palpable. Extremities without peripheral edema. Her breathing is normal and unlabored.     Vitals:    04/20/22 1436   BP: (!) 142/94   BP Location: Left upper arm   Patient Position: Sitting   Pulse: 92   Resp: 15   SpO2: 94%   Weight: 84.4 kg (186 lb)   Height: 1.651 m (5' 5\")     Neurologic examination:  Mental status:  The patient is alert, attentive, and oriented. Speech is clear and fluent with good repetition, comprehension, and naming.  Remote and recent memory are normal as well as fund of knowledge.    Cranial nerves:  CN II: Visual fields are full to confrontation.  Pupils are equal and briskly reactive to light.   CN III, IV, VI: Extra-occular muscles are intact  CN V: Facial sensation is intact and equal in V1-V3 distributions bilaterally.   CN VII: Face is symmetric with normal eye closure and smile.  CN VIII: Hearing is normal to rubbing fingers  CN IX, X: Palate elevates symmetrically. Phonation is normal.  CN XI: Head turning and shoulder shrug are intact  CN XII: Tongue is midline with normal movements and no atrophy.    Motor:  There is no pronator drift.  Muscle bulk and tone are normal.    Deltoid Biceps Triceps Wrist ext Hand  " Finger Spread Hip flexion Knee ext Dorsi-  flexion EHL Plantar Flexion   L 5 5 5 5 5 5 5 5 5 5 5   R 5 5 5 5 5 5 4+ PL 5 5 5 5     Reflexes:  Reflexes are 2+ and symmetric at the biceps, brachialis, triceps, patellar, and Achilli's. There is no Dumont's sign or ankle clonus.    Sensory:   Intact light touch, pinprick, and position sense, throughout all 4 extremities.    Coordination:  There is no dysmetria on finger-to-nose testing.  Romberg is absent.    Gait:  Gait is steady with normal steps.  Heel and toe walking are normal. Tandem gait is normal.    MSK: tenderness to palpation over the PSIS.  No pain with steps, minor discomfort with zena's maneuver bilaterally    Data Review:  X-RAY LUMBAR SPINE COMPLETE WITH BENDING (01/18/2022)    AP, lateral, bilateral oblique, and cone down views of the lumbar spine demonstrate mild scoliosis of the lumbar spine. There are multilevel facet  hypertrophy and disc disease throughout the lumbar spine. There is grade 1 anterolisthesis of L4 on L5. On the flexion and extension views, there is persistent grade 1 anterolisthesis of L4 on L5 without significant change compared to prior study. No definite evidence of acute fracture or dislocation.     IMPRESSION:  Grade 1 anterolisthesis of L4 on L5 with multilevel disc disease and facet hypertrophy. No acute fracture or dislocation.     X-rays of the lumbar spine without bending performed 1/12/22  There is moderate disc space narrowing at L3-L4 with vacuum disc. Severe disc space narrowing is present at L4-L5 with approximately 9 mm grade 1 anterolisthesis of L4 related to L5. There is mild narrowing of the disc space at L5-S1. No spondylolysis. Hypertrophic degenerative facet disease is present at L4-L5 and L5-S1. No fracture, subluxation, or bone lesion. Sacroiliac joints without erosion.    Impression: Severe degenerative disease at L4-L5 with grade 1 anterolisthesis. Degenerative disease is also present at L4-L5, L5-S1.           MR of the lumbar spine performed 3/19/21  Impression:   1. Grade 1 anterolisthesis of L4 over L5 with multifactorial moderate to severe spinal stenosis. There is neural foraminal narrowing and lateral recesses stenosis art this level, with abutent on bilateral exiting L4 nerve roots and impingement on the bilateral exiting L4 nerve roots and impingement on the bilateral traverse L5 nerve roots.  2. Right paracentral disc intrusion at L2-L3 impinges the traversing right L3 nerve root within the lateral recess.   Images were personally reviewed by myself and with the patient.    L1-L2: Disc bulge without substantial spinal canal narrowing. There is mild left neural foraminal narrowing.    L2-L3: Approximately 3 mm retrolisthesis of L2 over L3, with disc bulge and a superimposed right paracentral disc protrusion. There is mild facet arthrosis and slight prominence of the ligamentum flavum. There is narrowing of the right lateral recess with impingement on the traversing right L3 nerve root there spinal canal is overall mildly narrowed. There is mild bilateral neural foraminal narrowing.    L3-L4: mild disc bulge, mild facet arthrosis and slight prominence of the ligamentum flavum, resulting in mild spinal canal narrowing. There is narrowing of the lateral recesses, greater on the left, with crowding of the traversing left L4 nerve root. There is mild to moderate bilateral neural foraminal narrowing.    L4-L5: approximately 7 mm anterolisthesis of L4 over L5, with disc bulge, advanced facet arthrosis and prominence of the ligamentum flavum. The spinal canal is moderately to severely narrowed. There is bilateral L5 lateral recess stenosis/impingement. There is moderate bilateral neural foraminal narrowing with abutment on the bilateral exiting L4 nerve roots.    L5-S1: Disc bulge and left sided facet arthrosis, without substantial spinal canal narrowing. No substantial neural foraminal narrowing.     Assessment and  Plan:    In summary, Roseline Bray is a 66 y.o. female with progressive history of lumbosacral, gluteal, right greater than left posterolateral thigh, foot discomfort.  Her symptoms are suggestive of L4, L5 radiculopathy.  She has concordant radiographic findings of high-grade L4-L5 stenosis on the basis of a grade 1-2 spondylolisthesis.  Upright flexion, extension x-rays of the lumbosacral spine failed to disclose hypermobility.    Her symptoms have escalated over the past 1 year despite traditional modalities of care.  She has experienced some improvement with lifestyle modification, medication management as of late.  She wishes to defer on consideration of surgery at present.  This is reasonable given her high level of performance, benign neurologic exam.      The natural history of lumbar spondylosis, spondylolisthesis, stenosis was elaborated in great detail.  We have recommended she continue with lifestyle modification, core strengthening, symptomatic medication with anti-inflammatories, muscle relaxants, neuropathic medications (gabapentin, Lyrica), avoidance of excessive bending, twisting, or lifting, unrestrained forces across her axial skeleton.  Thereafter we look forward to seeing her return in follow-up consultation in 3 - 6 months time to reassess her progress.    Surgical intervention would be reserved for failure with the aforementioned treatment options. In the interim should her symptomatology evolve or worsen, I have asked she return sooner for prompt reevaluation.    Thank you for referring Roseline Bray  to my attention.  Please feel free to contact me anytime if I can be of further assistance.      I, Lola Bundy PA-C, am scribing for, and in the presence of Gerry Schmidt MD.    I, Gerry Schmidt MD, personally performed the services described in this documentation as scribed by Aaron Ulrich PA-C  in my presence, and it is accurate and complete.       I spent 30 minutes on this date  of service performing the following activities: obtaining history, performing examination, entering orders, documenting, preparing for visit, obtaining / reviewing records, providing counseling and education, independently reviewing study/studies, communicating results and coordinating care.

## 2023-05-10 ENCOUNTER — TELEPHONE (OUTPATIENT)
Dept: GASTROENTEROLOGY | Facility: CLINIC | Age: 68
End: 2023-05-10

## 2023-05-10 NOTE — TELEPHONE ENCOUNTER
Scheduled date of colonoscopy (as of today):07/14/2023  Physician performing colonoscopy: [facility]  Location of colonoscopy:[facility]  Clearances: n/a

## 2023-05-10 NOTE — TELEPHONE ENCOUNTER
[facility] Assessment    Name: Johan Abernathy  YOB: 1955  Last Height: 5 ' 5  Last weight: 188 lb  BMI: 31 3  Procedure: Colonoscopy   Diagnosis: screening for colon   Date of procedure:   Prep: TBD  Responsible :  Jessy Link)  Phone#: [phone]  Name completing form: Chhaya Bush  Date form completed: 05/10/23      If the patient answers yes to any of these questions, schedule in a hospital  Are you pregnant: No  Do you rely on a wheelchair for mobility: No  Have you been diagnosed with End Stage Renal Disease (ESRD): No  Do you need oxygen during the day: No  Have you had a heart attack or stroke within the past three months: No  Have you had a seizure within the past three months: No  Have you ever been informed by anesthesia that you have a difficult airway: No  Additional Questions  Have you had any cardiac testing or are under the care of a Cardiologist (see cardiac list): No  Cardiac list:   Do you have an implanted cardiac defibrillator: No (Comment:  This patient should be scheduled in the hospital)    Have any bleeding problems, such as anemia or hemophilia (If patient has H&H result below 8, schedule in hospital   H&H must be within 30 days of procedure): No    Had an organ transplant within the past 3 months: No    Do you have any present infections: No  Do you get short of breath when walking a few blocks: No  Have you been diagnosed with diabetes: No  Comments (provide cardiac provider information if applicable):

## 2023-05-10 NOTE — TELEPHONE ENCOUNTER
05/10/23  Screened by: Gabby Service    Referring Provider Dr Jackelyn Miller: There is no height or weight on file to calculate BMI  Has patient been referred for a routine screening Colonoscopy? yes  Is the patient between 39-70 years old? yes      Previous Colonoscopy no   If yes:    Date:    Facility:     Reason:       SCHEDULING STAFF: If the patient is between 45yrs-49yrs, please advise patient to confirm benefits/coverage with their insurance company for a routine screening colonoscopy, some insurance carriers will only cover at [address] or older  If the patient is over 66years old, please schedule an office visit  Does the patient want to see a Gastroenterologist prior to their procedure OR are they having any GI symptoms? no    Has the patient been hospitalized or had abdominal surgery in the past 6 months? no    Does the patient use supplemental oxygen? no    Does the patient take Coumadin, Lovenox, Plavix, Elliquis, Xarelto, or other blood thinning medication? no    Has the patient had a stroke, cardiac event, or stent placed in the past year? no    SCHEDULING STAFF: If patient answers NO to above questions, then schedule procedure  If patient answers YES to above questions, then schedule office appointment  Patient passed OA  If patient is between 45yrs - 49yrs, please advise patient that we will have to confirm benefits & coverage with their insurance company for a routine screening colonoscopy

## 2023-05-24 ENCOUNTER — OFFICE VISIT (OUTPATIENT)
Dept: NEUROSURGERY | Facility: CLINIC | Age: 68
End: 2023-05-24
Payer: COMMERCIAL

## 2023-05-24 VITALS
RESPIRATION RATE: 20 BRPM | OXYGEN SATURATION: 97 % | DIASTOLIC BLOOD PRESSURE: 72 MMHG | HEART RATE: 102 BPM | SYSTOLIC BLOOD PRESSURE: 128 MMHG

## 2023-05-24 DIAGNOSIS — M48.062 LUMBAR STENOSIS WITH NEUROGENIC CLAUDICATION: Primary | ICD-10-CM

## 2023-05-24 PROCEDURE — 99214 OFFICE O/P EST MOD 30 MIN: CPT | Performed by: NEUROLOGICAL SURGERY

## 2023-05-24 NOTE — LETTER
May 24, 2023     Flavio Bailey MD  [address]  [address]  [facility] [address]    Patient: Roseline Bray  YOB: 1955  Date of Visit: 2023      Dear Dr. Bailey:    Thank you for referring Roseline Bray to me for evaluation. Below are my notes for this consultation.    If you have questions, please do not hesitate to call me. I look forward to following your patient along with you.         Sincerely,        Gerry Schmidt MD        CC: Gerry Sy MD  2023  3:17 PM  Signed      [facility]  Medical Office [facility], [address]  [place] [address]  Phone: [phone]  Fax: [phone]        23      Re: Roseline Bray  : 1955      Chief Complaint:  Lumbosacral discomfort     History of Present Illness:   Roseline Bray is a 67 y.o. right handed female who presents in follow up neurosurgical consultation for evaluation of her lumbosacral discomfort.  Her symptoms began in 2020 shortly after a left partial knee arthroplasty procedure.  At that time her pain was located principally in the lumbosacral region with extension into the gluteal, upper thigh area, more so on the right.  Her symptoms gradually progressed and she sought medical evaluation in early .    She was referred for multiple conservative modalities of care inclusive of lifestyle modification, medication management, rehabilitative exercise.  Her pain symptoms partially responded to these therapies.  Unfortunately she developed worsening right-sided radicular extension of pain into her posterolateral thigh, shin, dorsum of her foot.  MRI of the lumbosacral spine was obtained disclosing L4-L5 spondylolisthesis, stenosis.  She was referred for injection based procedures thereafter.  She underwent injections in April, , 2021.  She experienced only transient improvement.  Given her failure with  traditional modalities of care, she was referred to my attention in secondary consultation.    On interview today, she continues with lumbosacral pain which extends into the right gluteal, proximal thigh region. Her pain will vacillate in severity between a 3 to 8 out of 10.  Her average level of discomfort is an 4-5 out of 10.  Postural activities can magnify her symptoms.  She has resolution of tingling in the right shin to the dorsum of her foot. She had completed physical therapy with improvement. She continues with gabapentin nightly and flexeril as needed. She stopped taking meloxicam secondary to blood in her stool.  She however denies symptoms of south numbness, weakness, bowel, bladder, gait dysfunction.  She ambulates without the need for an assistive device.  She reports a moderate to severe level of impairment in her activities of daily living secondary to the aforementioned issues.      Medical History:  has no past medical history on file.    Surgical History:  has no past surgical history on file.    Family History: Family history non-contributory to neurological condition.    Social History:   Social History     Socioeconomic History   • Marital status:      Spouse name: None   • Number of children: None   • Years of education: None   • Highest education level: None   Tobacco Use   • Smoking status: Never   • Smokeless tobacco: Never        Allergies: No Known Allergies    Medications:   Current Outpatient Medications   Medication Sig Dispense Refill   • clorazepate 3.75 mg tablet Take 3.75 mg by mouth once daily.     • cyclobenzaprine 5 mg tablet TAKE 1 TABLET BY MOUTH 3 TIMES DAILY as needed FOR MUSCLE SPASMS     • enalapril 10 mg tablet Take 10 mg by mouth once daily.     • enalapril 20 mg tablet TAKE 1 TABLET BY MOUTH BY MOUTH DAILY     • gabapentin 300 mg capsule TAKE 1 CAPSULE BY MOUTH EVERY DAY AT BEDTIME     • meclizine 25 mg tablet TAKE 1 TABLET BY MOUTH TWICE DAILY as needed FOR  dizziness     • meloxicam 15 mg tablet Take 15 mg by mouth daily as needed.     • metaxalone 800 mg tablet Take 800 mg by mouth 3 (three) times a day as needed.     • oxyCODONE 5 mg immediate release tablet Take 5 mg by mouth every 4 (four) hours.     • topiramate 25 mg tablet Take 25 mg by mouth nightly.     • venlafaxine XR 37.5 mg 24 hr capsule Take 37.5 mg by mouth 2 (two) times a day.       No current facility-administered medications for this visit.       Review of Systems:   A 14 point review of systems was performed and aside from what is mentioned above is otherwise negative.    General physical examination:  The patient is well appearing female, sitting upright in her chair in no acute distress, she appears her stated age.  Her head is atraumatic, normocephalic. Her neck is supple without obvious adenopathy. Oral mucosa is moist. Her peripheral pulses are symmetric and palpable. Extremities without peripheral edema. Her breathing is normal and unlabored.     Vitals:    05/24/23 1427   BP: 128/72   BP Location: Left upper arm   Patient Position: Sitting   Pulse: (!) 102   Resp: 20   SpO2: 97%        Neurologic examination:  Mental status:  The patient is alert, attentive, and oriented. Speech is clear and fluent with good repetition, comprehension, and naming.  Remote and recent memory are normal as well as fund of knowledge.    Cranial nerves:  CN II: Pupils are equal and briskly reactive to light.   CN III, IV, VI: Extra-occular muscles are intact  CN V: Facial sensation is grossly intact  CN VII: Face is symmetric with normal eye closure and smile.  CN VIII: Hearing is grossly intact  CN IX, X: Phonation is normal.  CN XI: Head turning and shoulder shrug are intact  CN XII: Tongue is midline with normal movements and no atrophy.    Motor:  Muscle bulk and tone are normal.    Deltoid Biceps Triceps Wrist ext Hand  Finger Spread Hip flexion Knee ext Dorsi-  flexion EHL Plantar Flexion   L 5 5 5 5 5 5 5  5 5 5 5   R 5 5 5 5 5 5 5 5 5 5 5     Reflexes:  Reflexes are 2+ and symmetric at the biceps, brachialis, triceps, patellar, and Achilli's. There is no Dumont's sign or ankle clonus.    Sensory:   Intact light touch throughout all 4 extremities.    Coordination:  There is no dysmetria on finger-to-nose testing.  Romberg is absent.    Gait:  Gait is steady with normal steps.  Heel and toe walking are normal. Tandem gait is normal.    MSK: tenderness to palpation over the PSIS.  No pain with steps, minor discomfort with zena's maneuver bilaterally    Data Review:  X-RAY LUMBAR SPINE COMPLETE WITH BENDING 01/18/2022  AP, lateral, bilateral oblique, and cone down views of the lumbar spine demonstrate mild scoliosis of the lumbar spine. There are multilevel facet hypertrophy and disc disease throughout the lumbar spine. There is grade 1 anterolisthesis of L4 on L5. On the flexion and extension views, there is persistent grade 1 anterolisthesis of L4 on L5 without significant change compared to prior study. No definite evidence of acute fracture or dislocation.     IMPRESSION:  Grade 1 anterolisthesis of L4 on L5 with multilevel disc disease and facet hypertrophy. No acute fracture or dislocation.     X-rays of the lumbar spine without bending performed 1/12/22  There is moderate disc space narrowing at L3-L4 with vacuum disc. Severe disc space narrowing is present at L4-L5 with approximately 9 mm grade 1 anterolisthesis of L4 related to L5. There is mild narrowing of the disc space at L5-S1. No spondylolysis. Hypertrophic degenerative facet disease is present at L4-L5 and L5-S1. No fracture, subluxation, or bone lesion. Sacroiliac joints without erosion.    Impression: Severe degenerative disease at L4-L5 with grade 1 anterolisthesis. Degenerative disease is also present at L4-L5, L5-S1.          MR of the lumbar spine performed 3/19/21  Impression:   1. Grade 1 anterolisthesis of L4 over L5 with multifactorial  moderate to severe spinal stenosis. There is neural foraminal narrowing and lateral recesses stenosis art this level, with abutent on bilateral exiting L4 nerve roots and impingement on the bilateral exiting L4 nerve roots and impingement on the bilateral traverse L5 nerve roots.  2. Right paracentral disc intrusion at L2-L3 impinges the traversing right L3 nerve root within the lateral recess.   Images were personally reviewed by myself and with the patient.    L1-L2: Disc bulge without substantial spinal canal narrowing. There is mild left neural foraminal narrowing.    L2-L3: Approximately 3 mm retrolisthesis of L2 over L3, with disc bulge and a superimposed right paracentral disc protrusion. There is mild facet arthrosis and slight prominence of the ligamentum flavum. There is narrowing of the right lateral recess with impingement on the traversing right L3 nerve root there spinal canal is overall mildly narrowed. There is mild bilateral neural foraminal narrowing.    L3-L4: mild disc bulge, mild facet arthrosis and slight prominence of the ligamentum flavum, resulting in mild spinal canal narrowing. There is narrowing of the lateral recesses, greater on the left, with crowding of the traversing left L4 nerve root. There is mild to moderate bilateral neural foraminal narrowing.    L4-L5: approximately 7 mm anterolisthesis of L4 over L5, with disc bulge, advanced facet arthrosis and prominence of the ligamentum flavum. The spinal canal is moderately to severely narrowed. There is bilateral L5 lateral recess stenosis/impingement. There is moderate bilateral neural foraminal narrowing with abutment on the bilateral exiting L4 nerve roots.    L5-S1: Disc bulge and left sided facet arthrosis, without substantial spinal canal narrowing. No substantial neural foraminal narrowing.     Assessment and Plan:    In summary, Roseline Bray is a 67 y.o. female with progressive history of lumbosacral, gluteal, right  greater than left posterolateral thigh, foot discomfort.  Her symptoms are suggestive of L4, L5 radiculopathy.  She has concordant radiographic findings of high-grade L4-L5 stenosis on the basis of a grade 1-2 spondylolisthesis.  Upright flexion, extension x-rays of the lumbosacral spine failed to disclose hypermobility.    Her symptoms have escalated over the past year despite traditional modalities of care.  She has experienced some improvement with lifestyle modification, medication management as of late.  We will obtain a new MRI in light of her persistent, refractory pain symptoms.  Thereafter I look forward to seeing her return follow-up consultation to outline the most appropriate course of treatment.     The natural history of lumbar spondylosis, spondylolisthesis, stenosis was elaborated in great detail.  We have recommended she continue with lifestyle modification, core strengthening, symptomatic medication with anti-inflammatories, muscle relaxants, neuropathic medications (gabapentin, Lyrica), avoidance of excessive bending, twisting, or lifting, unrestrained forces across her axial skeleton.      Surgical intervention would be reserved for failure with the aforementioned treatment options. In the interim should her symptomatology evolve or worsen, I have asked she return sooner for prompt reevaluation.    Thank you for referring Roseline Bray  to my attention.  Please feel free to contact me anytime if I can be of further assistance.        MD ELIANE Pozo Abby Yochim PA-C, am scribing for, and in the presence of Gerry Schmidt MD.    Gerry DEL RIO MD, personally performed the services described in this documentation as scribed by Moraima Song PA-C  in my presence, and it is accurate and complete.       I spent 30 minutes on this date of service performing the following activities: obtaining history, performing examination, entering orders, documenting, preparing for visit, obtaining /  reviewing records, providing counseling and education, independently reviewing study/studies, communicating results and coordinating care.

## 2023-05-24 NOTE — PROGRESS NOTES
[facility]  Medical Office [address], [address]  [place] [address]  Phone: [phone]  Fax: [phone]        23      Re: Roseline Bray  : 1955      Chief Complaint:  Lumbosacral discomfort     History of Present Illness:   Roseline Bray is a 67 y.o. right handed female who presents in follow up neurosurgical consultation for evaluation of her lumbosacral discomfort.  Her symptoms began in 2020 shortly after a left partial knee arthroplasty procedure.  At that time her pain was located principally in the lumbosacral region with extension into the gluteal, upper thigh area, more so on the right.  Her symptoms gradually progressed and she sought medical evaluation in early .    She was referred for multiple conservative modalities of care inclusive of lifestyle modification, medication management, rehabilitative exercise.  Her pain symptoms partially responded to these therapies.  Unfortunately she developed worsening right-sided radicular extension of pain into her posterolateral thigh, shin, dorsum of her foot.  MRI of the lumbosacral spine was obtained disclosing L4-L5 spondylolisthesis, stenosis.  She was referred for injection based procedures thereafter.  She underwent injections in April, , 2021.  She experienced only transient improvement.  Given her failure with traditional modalities of care, she was referred to my attention in secondary consultation.    On interview today, she continues with lumbosacral pain which extends into the right gluteal, proximal thigh region. Her pain will vacillate in severity between a 3 to 8 out of 10.  Her average level of discomfort is an 4-5 out of 10.  Postural activities can magnify her symptoms.  She has resolution of tingling in the right shin to the dorsum of her foot. She had completed physical therapy with improvement. She continues with gabapentin  nightly and flexeril as needed. She stopped taking meloxicam secondary to blood in her stool.  She however denies symptoms of south numbness, weakness, bowel, bladder, gait dysfunction.  She ambulates without the need for an assistive device.  She reports a moderate to severe level of impairment in her activities of daily living secondary to the aforementioned issues.      Medical History:  has no past medical history on file.    Surgical History:  has no past surgical history on file.    Family History: Family history non-contributory to neurological condition.    Social History:   Social History     Socioeconomic History   • Marital status:      Spouse name: None   • Number of children: None   • Years of education: None   • Highest education level: None   Tobacco Use   • Smoking status: Never   • Smokeless tobacco: Never        Allergies: No Known Allergies    Medications:   Current Outpatient Medications   Medication Sig Dispense Refill   • clorazepate 3.75 mg tablet Take 3.75 mg by mouth once daily.     • cyclobenzaprine 5 mg tablet TAKE 1 TABLET BY MOUTH 3 TIMES DAILY as needed FOR MUSCLE SPASMS     • enalapril 10 mg tablet Take 10 mg by mouth once daily.     • enalapril 20 mg tablet TAKE 1 TABLET BY MOUTH BY MOUTH DAILY     • gabapentin 300 mg capsule TAKE 1 CAPSULE BY MOUTH EVERY DAY AT BEDTIME     • meclizine 25 mg tablet TAKE 1 TABLET BY MOUTH TWICE DAILY as needed FOR dizziness     • meloxicam 15 mg tablet Take 15 mg by mouth daily as needed.     • metaxalone 800 mg tablet Take 800 mg by mouth 3 (three) times a day as needed.     • oxyCODONE 5 mg immediate release tablet Take 5 mg by mouth every 4 (four) hours.     • topiramate 25 mg tablet Take 25 mg by mouth nightly.     • venlafaxine XR 37.5 mg 24 hr capsule Take 37.5 mg by mouth 2 (two) times a day.       No current facility-administered medications for this visit.       Review of Systems:   A 14 point review of systems was performed and aside  from what is mentioned above is otherwise negative.    General physical examination:  The patient is well appearing female, sitting upright in her chair in no acute distress, she appears her stated age.  Her head is atraumatic, normocephalic. Her neck is supple without obvious adenopathy. Oral mucosa is moist. Her peripheral pulses are symmetric and palpable. Extremities without peripheral edema. Her breathing is normal and unlabored.     Vitals:    05/24/23 1427   BP: 128/72   BP Location: Left upper arm   Patient Position: Sitting   Pulse: (!) 102   Resp: 20   SpO2: 97%        Neurologic examination:  Mental status:  The patient is alert, attentive, and oriented. Speech is clear and fluent with good repetition, comprehension, and naming.  Remote and recent memory are normal as well as fund of knowledge.    Cranial nerves:  CN II: Pupils are equal and briskly reactive to light.   CN III, IV, VI: Extra-occular muscles are intact  CN V: Facial sensation is grossly intact  CN VII: Face is symmetric with normal eye closure and smile.  CN VIII: Hearing is grossly intact  CN IX, X: Phonation is normal.  CN XI: Head turning and shoulder shrug are intact  CN XII: Tongue is midline with normal movements and no atrophy.    Motor:  Muscle bulk and tone are normal.    Deltoid Biceps Triceps Wrist ext Hand  Finger Spread Hip flexion Knee ext Dorsi-  flexion EHL Plantar Flexion   L 5 5 5 5 5 5 5 5 5 5 5   R 5 5 5 5 5 5 5 5 5 5 5     Reflexes:  Reflexes are 2+ and symmetric at the biceps, brachialis, triceps, patellar, and Achilli's. There is no Dumont's sign or ankle clonus.    Sensory:   Intact light touch throughout all 4 extremities.    Coordination:  There is no dysmetria on finger-to-nose testing.  Romberg is absent.    Gait:  Gait is steady with normal steps.  Heel and toe walking are normal. Tandem gait is normal.    MSK: tenderness to palpation over the PSIS.  No pain with steps, minor discomfort with zena's  maneuver bilaterally    Data Review:  X-RAY LUMBAR SPINE COMPLETE WITH BENDING 01/18/2022  AP, lateral, bilateral oblique, and cone down views of the lumbar spine demonstrate mild scoliosis of the lumbar spine. There are multilevel facet hypertrophy and disc disease throughout the lumbar spine. There is grade 1 anterolisthesis of L4 on L5. On the flexion and extension views, there is persistent grade 1 anterolisthesis of L4 on L5 without significant change compared to prior study. No definite evidence of acute fracture or dislocation.     IMPRESSION:  Grade 1 anterolisthesis of L4 on L5 with multilevel disc disease and facet hypertrophy. No acute fracture or dislocation.     X-rays of the lumbar spine without bending performed 1/12/22  There is moderate disc space narrowing at L3-L4 with vacuum disc. Severe disc space narrowing is present at L4-L5 with approximately 9 mm grade 1 anterolisthesis of L4 related to L5. There is mild narrowing of the disc space at L5-S1. No spondylolysis. Hypertrophic degenerative facet disease is present at L4-L5 and L5-S1. No fracture, subluxation, or bone lesion. Sacroiliac joints without erosion.    Impression: Severe degenerative disease at L4-L5 with grade 1 anterolisthesis. Degenerative disease is also present at L4-L5, L5-S1.          MR of the lumbar spine performed 3/19/21  Impression:   1. Grade 1 anterolisthesis of L4 over L5 with multifactorial moderate to severe spinal stenosis. There is neural foraminal narrowing and lateral recesses stenosis art this level, with abutent on bilateral exiting L4 nerve roots and impingement on the bilateral exiting L4 nerve roots and impingement on the bilateral traverse L5 nerve roots.  2. Right paracentral disc intrusion at L2-L3 impinges the traversing right L3 nerve root within the lateral recess.   Images were personally reviewed by myself and with the patient.    L1-L2: Disc bulge without substantial spinal canal narrowing. There is  mild left neural foraminal narrowing.    L2-L3: Approximately 3 mm retrolisthesis of L2 over L3, with disc bulge and a superimposed right paracentral disc protrusion. There is mild facet arthrosis and slight prominence of the ligamentum flavum. There is narrowing of the right lateral recess with impingement on the traversing right L3 nerve root there spinal canal is overall mildly narrowed. There is mild bilateral neural foraminal narrowing.    L3-L4: mild disc bulge, mild facet arthrosis and slight prominence of the ligamentum flavum, resulting in mild spinal canal narrowing. There is narrowing of the lateral recesses, greater on the left, with crowding of the traversing left L4 nerve root. There is mild to moderate bilateral neural foraminal narrowing.    L4-L5: approximately 7 mm anterolisthesis of L4 over L5, with disc bulge, advanced facet arthrosis and prominence of the ligamentum flavum. The spinal canal is moderately to severely narrowed. There is bilateral L5 lateral recess stenosis/impingement. There is moderate bilateral neural foraminal narrowing with abutment on the bilateral exiting L4 nerve roots.    L5-S1: Disc bulge and left sided facet arthrosis, without substantial spinal canal narrowing. No substantial neural foraminal narrowing.     Assessment and Plan:    In summary, Roseline Bray is a 67 y.o. female with progressive history of lumbosacral, gluteal, right greater than left posterolateral thigh, foot discomfort.  Her symptoms are suggestive of L4, L5 radiculopathy.  She has concordant radiographic findings of high-grade L4-L5 stenosis on the basis of a grade 1-2 spondylolisthesis.  Upright flexion, extension x-rays of the lumbosacral spine failed to disclose hypermobility.    Her symptoms have escalated over the past year despite traditional modalities of care.  She has experienced some improvement with lifestyle modification, medication management as of late.  We will obtain a new MRI in  light of her persistent, refractory pain symptoms.  Thereafter I look forward to seeing her return follow-up consultation to outline the most appropriate course of treatment.     The natural history of lumbar spondylosis, spondylolisthesis, stenosis was elaborated in great detail.  We have recommended she continue with lifestyle modification, core strengthening, symptomatic medication with anti-inflammatories, muscle relaxants, neuropathic medications (gabapentin, Lyrica), avoidance of excessive bending, twisting, or lifting, unrestrained forces across her axial skeleton.      Surgical intervention would be reserved for failure with the aforementioned treatment options. In the interim should her symptomatology evolve or worsen, I have asked she return sooner for prompt reevaluation.    Thank you for referring Roseline Bray  to my attention.  Please feel free to contact me anytime if I can be of further assistance.        MD ELIANE Pozo Abby Yochim PA-C, am scribing for, and in the presence of Gerry Schmidt MD.    Gerry DEL RIO MD, personally performed the services described in this documentation as scribed by Moraima Song PA-C  in my presence, and it is accurate and complete.       I spent 30 minutes on this date of service performing the following activities: obtaining history, performing examination, entering orders, documenting, preparing for visit, obtaining / reviewing records, providing counseling and education, independently reviewing study/studies, communicating results and coordinating care.

## 2023-06-21 ENCOUNTER — HOSPITAL ENCOUNTER (OUTPATIENT)
Dept: RADIOLOGY | Age: 68
Discharge: HOME | End: 2023-06-21
Attending: PHYSICIAN ASSISTANT
Payer: COMMERCIAL

## 2023-06-21 DIAGNOSIS — M48.062 LUMBAR STENOSIS WITH NEUROGENIC CLAUDICATION: ICD-10-CM

## 2023-06-30 ENCOUNTER — TELEPHONE (OUTPATIENT)
Dept: GASTROENTEROLOGY | Facility: CLINIC | Age: 68
End: 2023-06-30

## 2023-06-30 DIAGNOSIS — Z12.11 COLON CANCER SCREENING: Primary | ICD-10-CM

## 2023-06-30 NOTE — TELEPHONE ENCOUNTER
Procedure confirmed  Colonoscopy     Via: Spoke with patient  Instructions given: Email     Prep Given: Golytely    Call the office if there are any questions  Golytely to be sent to [facility]

## 2023-07-07 ENCOUNTER — TELEPHONE (OUTPATIENT)
Age: 68
End: 2023-07-07

## 2023-07-07 NOTE — TELEPHONE ENCOUNTER
Patients GI provider:  Dr. Storey Peer    Number to return call: [phone]    Reason for call: Pt calling stating pharmacy did not receive bowel prep. Please resend.     Scheduled procedure/appointment date if applicable: Procedure 2/90/00

## 2023-07-07 NOTE — TELEPHONE ENCOUNTER
Patient called back and stated prep golytely is not at her pharmacy please review and call her back for an update pt is scheduled on 07/14/2023 thank you phone # [phone]

## 2023-07-13 ENCOUNTER — ANESTHESIA (OUTPATIENT)
Dept: ANESTHESIOLOGY | Facility: AMBULATORY SURGERY CENTER | Age: 68
End: 2023-07-13

## 2023-07-13 ENCOUNTER — ANESTHESIA EVENT (OUTPATIENT)
Dept: ANESTHESIOLOGY | Facility: AMBULATORY SURGERY CENTER | Age: 68
End: 2023-07-13

## 2023-07-14 ENCOUNTER — ANESTHESIA (OUTPATIENT)
Dept: GASTROENTEROLOGY | Facility: AMBULATORY SURGERY CENTER | Age: 68
End: 2023-07-14

## 2023-07-14 ENCOUNTER — TELEPHONE (OUTPATIENT)
Dept: GASTROENTEROLOGY | Facility: CLINIC | Age: 68
End: 2023-07-14

## 2023-07-14 ENCOUNTER — HOSPITAL ENCOUNTER (OUTPATIENT)
Dept: GASTROENTEROLOGY | Facility: AMBULATORY SURGERY CENTER | Age: 68
Discharge: HOME | End: 2023-07-14
Payer: COMMERCIAL

## 2023-07-14 ENCOUNTER — ANESTHESIA EVENT (OUTPATIENT)
Dept: GASTROENTEROLOGY | Facility: AMBULATORY SURGERY CENTER | Age: 68
End: 2023-07-14

## 2023-07-14 ENCOUNTER — TELEPHONE (OUTPATIENT)
Age: 68
End: 2023-07-14

## 2023-07-14 VITALS
DIASTOLIC BLOOD PRESSURE: 68 MMHG | TEMPERATURE: 97.8 F | RESPIRATION RATE: 20 BRPM | OXYGEN SATURATION: 98 % | BODY MASS INDEX: 29.99 KG/M2 | WEIGHT: 180 LBS | HEIGHT: 65 IN | SYSTOLIC BLOOD PRESSURE: 118 MMHG | HEART RATE: 76 BPM

## 2023-07-14 DIAGNOSIS — K63.89 MASS OF COLON: Primary | ICD-10-CM

## 2023-07-14 DIAGNOSIS — Z12.11 SCREENING FOR COLON CANCER: ICD-10-CM

## 2023-07-14 PROCEDURE — 45380 COLONOSCOPY AND BIOPSY: CPT | Performed by: INTERNAL MEDICINE

## 2023-07-14 PROCEDURE — 88342 IMHCHEM/IMCYTCHM 1ST ANTB: CPT | Performed by: STUDENT IN AN ORGANIZED HEALTH CARE EDUCATION/TRAINING PROGRAM

## 2023-07-14 PROCEDURE — 45381 COLONOSCOPY SUBMUCOUS NJX: CPT | Performed by: INTERNAL MEDICINE

## 2023-07-14 PROCEDURE — 45385 COLONOSCOPY W/LESION REMOVAL: CPT | Performed by: INTERNAL MEDICINE

## 2023-07-14 PROCEDURE — 88341 IMHCHEM/IMCYTCHM EA ADD ANTB: CPT | Performed by: STUDENT IN AN ORGANIZED HEALTH CARE EDUCATION/TRAINING PROGRAM

## 2023-07-14 PROCEDURE — 88305 TISSUE EXAM BY PATHOLOGIST: CPT | Performed by: STUDENT IN AN ORGANIZED HEALTH CARE EDUCATION/TRAINING PROGRAM

## 2023-07-14 RX ORDER — PROPOFOL 10 MG/ML
INJECTION, EMULSION INTRAVENOUS AS NEEDED
Status: DISCONTINUED | OUTPATIENT
Start: 2023-07-14 | End: 2023-07-14

## 2023-07-14 RX ORDER — LIDOCAINE HYDROCHLORIDE 10 MG/ML
INJECTION, SOLUTION EPIDURAL; INFILTRATION; INTRACAUDAL; PERINEURAL AS NEEDED
Status: DISCONTINUED | OUTPATIENT
Start: 2023-07-14 | End: 2023-07-14

## 2023-07-14 RX ORDER — SODIUM CHLORIDE, SODIUM LACTATE, POTASSIUM CHLORIDE, CALCIUM CHLORIDE 600; 310; 30; 20 MG/100ML; MG/100ML; MG/100ML; MG/100ML
50 INJECTION, SOLUTION INTRAVENOUS CONTINUOUS
Status: DISCONTINUED | OUTPATIENT
Start: 2023-07-14 | End: 2023-07-18 | Stop reason: HOSPADM

## 2023-07-14 RX ADMIN — PROPOFOL 30 MG: 10 INJECTION, EMULSION INTRAVENOUS at 07:45

## 2023-07-14 RX ADMIN — PROPOFOL 30 MG: 10 INJECTION, EMULSION INTRAVENOUS at 07:31

## 2023-07-14 RX ADMIN — LIDOCAINE HYDROCHLORIDE 50 MG: 10 INJECTION, SOLUTION EPIDURAL; INFILTRATION; INTRACAUDAL; PERINEURAL at 07:28

## 2023-07-14 RX ADMIN — PROPOFOL 150 MG: 10 INJECTION, EMULSION INTRAVENOUS at 07:28

## 2023-07-14 RX ADMIN — PROPOFOL 30 MG: 10 INJECTION, EMULSION INTRAVENOUS at 07:42

## 2023-07-14 RX ADMIN — PROPOFOL 30 MG: 10 INJECTION, EMULSION INTRAVENOUS at 07:38

## 2023-07-14 RX ADMIN — PROPOFOL 30 MG: 10 INJECTION, EMULSION INTRAVENOUS at 07:52

## 2023-07-14 RX ADMIN — PROPOFOL 20 MG: 10 INJECTION, EMULSION INTRAVENOUS at 07:34

## 2023-07-14 RX ADMIN — PROPOFOL 30 MG: 10 INJECTION, EMULSION INTRAVENOUS at 07:48

## 2023-07-14 RX ADMIN — SODIUM CHLORIDE, SODIUM LACTATE, POTASSIUM CHLORIDE, CALCIUM CHLORIDE 50 ML/HR: 600; 310; 30; 20 INJECTION, SOLUTION INTRAVENOUS at 07:13

## 2023-07-14 NOTE — ANESTHESIA PREPROCEDURE EVALUATION
Procedure:  COLONOSCOPY    Relevant Problems   No relevant active problems      Hypertension Hyperlipidemia   Depression        Physical Exam    Airway    Mallampati score: II  TM Distance: >3 FB  Neck ROM: full     Dental   No notable dental hx     Cardiovascular      Pulmonary      Other Findings        Anesthesia Plan  ASA Score- 2     Anesthesia Type- IV sedation with anesthesia with ASA Monitors. Additional Monitors:   Airway Plan:           Plan Factors-Exercise tolerance (METS): >4 METS. Chart reviewed. Patient is not a current smoker. Induction- intravenous. Postoperative Plan-     Informed Consent- Anesthetic plan and risks discussed with patient. I personally reviewed this patient with the CRNA. Discussed and agreed on the Anesthesia Plan with the CRNA. Charly Morales

## 2023-07-14 NOTE — ANESTHESIA POSTPROCEDURE EVALUATION
Post-Op Assessment Note    CV Status:  Stable  Pain Score: 0    Pain management: adequate     Mental Status:  Alert and awake   Hydration Status:  Euvolemic   PONV Controlled:  None   Airway Patency:  Patent      Post Op Vitals Reviewed: Yes      Staff: Anesthesiologist, CRNA   Comments: report given to RN; VSS; RA        No notable events documented.     BP   126/54   Temp      Pulse  84   Resp  16   SpO2   98

## 2023-07-14 NOTE — TELEPHONE ENCOUNTER
Order Name/Test Name: Ct Chest abdomen pelvis with contrast    Date of Service: TBD    Location/Facility name/Address/Phone [address] [place], [address] [place] [address], [phone]    Location/Facility NPI:[place] NPI: [id number]

## 2023-07-14 NOTE — H&P
History and Physical - [facility]  Will Kingsley 80 y.o. female MRN: [id number]    HPI: Will Kingsley is a 80y.o. year old female who presents for screening colonoscopy, increased risk due to family history of polyps (sister). REVIEW OF SYSTEMS: Per the HPI, and otherwise unremarkable.     Historical Information   Past Medical History:   Diagnosis Date   • Depression    • Hyperlipidemia    • Hypertension      Past Surgical History:   Procedure Laterality Date   • APPENDECTOMY      1985   • CATARACT EXTRACTION Bilateral    • DENTAL SURGERY      implants   • KNEE SURGERY Left    • OOPHORECTOMY Right      Social History   Social History     Substance and Sexual Activity   Alcohol Use Yes    Comment: socially-rare     Social History     Substance and Sexual Activity   Drug Use Never     Social History     Tobacco Use   Smoking Status Never   Smokeless Tobacco Never     Family History   Problem Relation Age of Onset   • Colon polyps Sister        Meds/Allergies       Current Outpatient Medications:   •  B Complex Vitamins (VITAMIN B-COMPLEX PO)  •  clorazepate (TRANXENE) 3.75 mg tablet  •  enalapril (VASOTEC) 20 mg tablet  •  ezetimibe (ZETIA) 10 mg tablet  •  topiramate (TOPAMAX) 50 MG tablet  •  venlafaxine (EFFEXOR-XR) 150 mg 24 hr capsule  •  furosemide (LASIX) 10 mg/mL oral solution  •  Multiple Vitamins-Minerals (OCUVITE PO)  •  polyethylene glycol (GOLYTELY) 4000 mL solution  •  VITAMIN D PO    Current Facility-Administered Medications:   •  lactated ringers infusion, 50 mL/hr, Intravenous, Continuous, 50 mL/hr at 07/14/23 0713    No Known Allergies    Objective     /65   Pulse 96   Temp 97.8 °F (36.6 °C) (Temporal)   Resp (!) 98   Ht 5' 5" (1.651 m)   Wt 81.6 kg (180 lb)   SpO2 98%   BMI 29.95 kg/m²     PHYSICAL EXAM    Gen: NAD AAOx3  Head: Normocephalic, Atraumatic  CV: S1S2 RRR no m/r/g  CHEST: Clear b/l no c/r/w  ABD: soft, +BS NT/ND  EXT: no edema    ASSESSMENT/PLAN:  This is a 80y.o. year old female here for colonoscopy, and she is stable and optimized for her procedure.

## 2023-07-14 NOTE — TELEPHONE ENCOUNTER
Patients GI provider:  Dr. Mary Redmond    Number to return call: [phone]    Reason for call: Pt calling stating Dr. Mary Redmond gave him a CT order and referred a doctor at [facility]. Pt called to make an appt and they informed him to call the office and get the Auth # from [id number] would like a call back today on this.     Scheduled procedure/appointment date if applicable: N/A

## 2023-07-14 NOTE — TELEPHONE ENCOUNTER
Spoke with pt. Told her I put in the request for the prior auth to be done and I will call her once I get a response.

## 2023-07-14 NOTE — TELEPHONE ENCOUNTER
Pt notified and order faxed with auth number. Pt is having blood work on Tuesday. Will fax over to [facility] after resulted.

## 2023-07-18 LAB
ALBUMIN SERPL-MCNC: 4.2 G/DL (ref 3.6–5.1)
ALBUMIN/GLOB SERPL: 1.9 (CALC) (ref 1–2.5)
ALP SERPL-CCNC: 72 U/L (ref 37–153)
ALT SERPL-CCNC: 19 U/L (ref 6–29)
AST SERPL-CCNC: 17 U/L (ref 10–35)
BASOPHILS # BLD AUTO: 48 CELLS/UL (ref 0–200)
BASOPHILS NFR BLD AUTO: 1.1 %
BILIRUB SERPL-MCNC: 0.3 MG/DL (ref 0.2–1.2)
BUN SERPL-MCNC: 13 MG/DL (ref 7–25)
BUN/CREAT SERPL: ABNORMAL (CALC) (ref 6–22)
CALCIUM SERPL-MCNC: 9.2 MG/DL (ref 8.6–10.4)
CEA SERPL-MCNC: 6.6 NG/ML
CHLORIDE SERPL-SCNC: 105 MMOL/L (ref 98–110)
CO2 SERPL-SCNC: 28 MMOL/L (ref 20–32)
CREAT SERPL-MCNC: 0.68 MG/DL (ref 0.5–1.05)
EOSINOPHIL # BLD AUTO: 211 CELLS/UL (ref 15–500)
EOSINOPHIL NFR BLD AUTO: 4.8 %
ERYTHROCYTE [DISTWIDTH] IN BLOOD BY AUTOMATED COUNT: 12 % (ref 11–15)
GFR/BSA.PRED SERPLBLD CYS-BASED-ARV: 95 ML/MIN/1.73M2
GLOBULIN SER CALC-MCNC: 2.2 G/DL (CALC) (ref 1.9–3.7)
GLUCOSE SERPL-MCNC: 100 MG/DL (ref 65–99)
HCT VFR BLD AUTO: 36.6 % (ref 35–45)
HGB BLD-MCNC: 13 G/DL (ref 11.7–15.5)
LYMPHOCYTES # BLD AUTO: 1091 CELLS/UL (ref 850–3900)
LYMPHOCYTES NFR BLD AUTO: 24.8 %
MCH RBC QN AUTO: 30.7 PG (ref 27–33)
MCHC RBC AUTO-ENTMCNC: 35.5 G/DL (ref 32–36)
MCV RBC AUTO: 86.5 FL (ref 80–100)
MONOCYTES # BLD AUTO: 436 CELLS/UL (ref 200–950)
MONOCYTES NFR BLD AUTO: 9.9 %
NEUTROPHILS # BLD AUTO: 2614 CELLS/UL (ref 1500–7800)
NEUTROPHILS NFR BLD AUTO: 59.4 %
PLATELET # BLD AUTO: 293 THOUSAND/UL (ref 140–400)
PMV BLD REES-ECKER: 10.1 FL (ref 7.5–12.5)
POTASSIUM SERPL-SCNC: 4 MMOL/L (ref 3.5–5.3)
PROT SERPL-MCNC: 6.4 G/DL (ref 6.1–8.1)
RBC # BLD AUTO: 4.23 MILLION/UL (ref 3.8–5.1)
SODIUM SERPL-SCNC: 140 MMOL/L (ref 135–146)
WBC # BLD AUTO: 4.4 THOUSAND/UL (ref 3.8–10.8)

## 2023-07-19 PROCEDURE — 88305 TISSUE EXAM BY PATHOLOGIST: CPT | Performed by: STUDENT IN AN ORGANIZED HEALTH CARE EDUCATION/TRAINING PROGRAM

## 2023-07-19 NOTE — RESULT ENCOUNTER NOTE
Blood count and chemistries, including liver tests all appear normal.  CEA which is a tumor marker consistent with colorectal cancer is mildly elevated. Biopsies are still pending. Await CT scans and consultation with colorectal surgery as planned.

## 2023-07-19 NOTE — RESULT ENCOUNTER NOTE
Discussed pathology results with patient. Tumor is confirmed to be a carcinoma. Lab work is normal except for mildly elevated CEA she is not unexpected given that we know that there is a cancer. Patient has appointment for CT scan and consultation with Dr. Maco Gallagher from colorectal surgery next week. Also advised to make appointment with Dr. Melissa Zee from oncology. Repeat colonoscopy approximately 1 week from surgery.

## 2023-07-24 NOTE — RESULT ENCOUNTER NOTE
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 Has consultation with Dr Agustin Current next week will also need to see Oncology/Dr Azevedo.   Please forward this addendum to Dr Vamsi Watts and [facility]

## 2024-03-27 ENCOUNTER — OFFICE VISIT (OUTPATIENT)
Dept: NEUROSURGERY | Facility: CLINIC | Age: 69
End: 2024-03-27
Payer: COMMERCIAL

## 2024-03-27 VITALS
RESPIRATION RATE: 20 BRPM | SYSTOLIC BLOOD PRESSURE: 132 MMHG | HEART RATE: 83 BPM | OXYGEN SATURATION: 98 % | DIASTOLIC BLOOD PRESSURE: 64 MMHG

## 2024-03-27 DIAGNOSIS — M43.16 SPONDYLOLISTHESIS OF LUMBAR REGION: ICD-10-CM

## 2024-03-27 DIAGNOSIS — M48.062 LUMBAR STENOSIS WITH NEUROGENIC CLAUDICATION: Primary | ICD-10-CM

## 2024-03-27 PROCEDURE — 99214 OFFICE O/P EST MOD 30 MIN: CPT | Performed by: NEUROLOGICAL SURGERY

## 2024-03-27 NOTE — LETTER
2024     Flavio Bailey MD  [address]  [address]  [facility] [address]    Patient: Roseline Bray  YOB: 1955  Date of Visit: 3/27/2024      Dear Dr. Bailey:    Thank you for referring Roseline Bray to me for evaluation. Below are my notes for this consultation.    If you have questions, please do not hesitate to call me. I look forward to following your patient along with you.         Sincerely,        Gerry Schmidt MD        CC: Gerry Sy MD  3/27/2024  3:16 PM  Sign when Signing Visit      [facility]  Medical Office [facility], [address]  [place] [address]  Phone: [phone]  Fax: [phone]        24      Re: Roseline Bray  : 1955      Chief Complaint:  Lumbosacral discomfort     History of Present Illness:   Roseline Bray is a 68 y.o. right handed female who presents in follow up neurosurgical consultation for evaluation of her lumbosacral discomfort.  Her symptoms began in 2020 shortly after a left partial knee arthroplasty procedure.  At that time her pain was located principally in the lumbosacral region with extension into the gluteal, upper thigh area, more so on the right.  Her symptoms gradually progressed and she sought medical evaluation in early .    She was referred for multiple conservative modalities of care inclusive of lifestyle modification, medication management, rehabilitative exercise.  Her pain symptoms partially responded to these therapies.  Unfortunately she developed worsening right-sided radicular extension of pain into her posterolateral thigh, shin, dorsum of her foot.  MRI of the lumbosacral spine was obtained disclosing L4-L5 spondylolisthesis, stenosis.  She was referred for injection based procedures thereafter.  She underwent injections in April, , 2021.  She experienced only transient improvement.   Given her failure with traditional modalities of care, she was referred to my attention in secondary consultation.    Since last being seen, she underwent a colonoscopy in June 2023. Cancerous polyps were found. She underwent colectomy surgery and 12 rounds of chemotherapy which ended in February of 2024. Her lumbosacral pain had been minimal through this period.     She returned to work recently and has began with more lumbosacral pain.  Her pain will vacillate in severity between a 3 to 8 out of 10.  Her average level of discomfort is a 4-5 out of 10.  Postural activities can magnify her symptoms. She denies new symptoms of south numbness, weakness, bowel, bladder, gait dysfunction.  She ambulates without the need for an assistive device.  She reports a moderate level of impairment in her activities of daily living secondary to the aforementioned issues.      Medical History:  has no past medical history on file.    Surgical History:  has no past surgical history on file.    Family History: Family history non-contributory to neurological condition.    Social History:   Social History     Socioeconomic History   • Marital status:      Spouse name: None   • Number of children: None   • Years of education: None   • Highest education level: None   Tobacco Use   • Smoking status: Never   • Smokeless tobacco: Never        Allergies:   Allergies   Allergen Reactions   • [employer]      Wound glue       Medications:   Current Outpatient Medications   Medication Sig Dispense Refill   • clorazepate 3.75 mg tablet Take 3.75 mg by mouth once daily.     • cyclobenzaprine 5 mg tablet TAKE 1 TABLET BY MOUTH 3 TIMES DAILY as needed FOR MUSCLE SPASMS     • enalapril 10 mg tablet Take 10 mg by mouth once daily.     • enalapril 20 mg tablet TAKE 1 TABLET BY MOUTH BY MOUTH DAILY     • gabapentin 300 mg capsule TAKE 1 CAPSULE BY MOUTH EVERY DAY AT BEDTIME     • meclizine 25 mg tablet TAKE 1 TABLET BY  MOUTH TWICE DAILY as needed FOR dizziness     • meloxicam 15 mg tablet Take 15 mg by mouth daily as needed.     • metaxalone 800 mg tablet Take 800 mg by mouth 3 (three) times a day as needed.     • oxyCODONE 5 mg immediate release tablet Take 5 mg by mouth every 4 (four) hours.     • topiramate 25 mg tablet Take 25 mg by mouth nightly.     • venlafaxine XR 37.5 mg 24 hr capsule Take 37.5 mg by mouth 2 (two) times a day.       No current facility-administered medications for this visit.       Review of Systems:   A 14 point review of systems was performed and aside from what is mentioned above is otherwise negative.    General physical examination:  The patient is well appearing female, sitting upright in her chair in no acute distress, she appears her stated age.  Her head is atraumatic, normocephalic. Her neck is supple without obvious adenopathy. Oral mucosa is moist. Her peripheral pulses are symmetric and palpable. Extremities without peripheral edema. Her breathing is normal and unlabored.     Vitals:    03/27/24 1424   BP: 132/64   Pulse: 83   Resp: 20   SpO2: 98%        Neurologic examination:  Mental status:  The patient is alert, attentive, and oriented. Speech is clear and fluent with good repetition, comprehension, and naming.  Remote and recent memory are normal as well as fund of knowledge.    Cranial nerves:  CN II: Pupils are equal and briskly reactive to light.   CN III, IV, VI: Extra-occular muscles are intact  CN V: Facial sensation is grossly intact  CN VII: Face is symmetric with normal eye closure and smile.  CN VIII: Hearing is grossly intact  CN IX, X: Phonation is normal.  CN XI: Head turning and shoulder shrug are intact  CN XII: Tongue is midline with normal movements and no atrophy.    Motor:  Muscle bulk and tone are normal.    Deltoid Biceps Triceps Wrist ext Hand  Finger Spread Hip flexion Knee ext Dorsi-  flexion EHL Plantar Flexion   L 5 5 5 5 5 5 5 5 5 5 5   R 5 5 5 5 5 5 5 5 5  5 5     Reflexes:  Reflexes are 2+ and symmetric at the biceps, brachialis, triceps, patellar, and Achilli's. There is no Dumont's sign or ankle clonus.    Sensory:   Intact light touch throughout all 4 extremities.    Coordination:  There is no dysmetria on finger-to-nose testing.  Romberg is absent.    Gait:  Gait is steady with normal steps.  Heel and toe walking are normal. Tandem gait is normal.    MSK: tenderness to palpation over the PSIS.  No pain with steps, minor discomfort with zena's maneuver bilaterally    Data Review:  MRI lumbar spine without contrast, 6/21/23  CLINICAL HISTORY: Low back pain radiating to right lower extremity.     COMMENT:MRI of the lumbar spine was performed on June 21, 2023 utilizing a 1.5  Karena magnet. Comparison is made to a previous study performed March 19, 2021.     There is a stable grade 1-2  anterolisthesis of L4 on L5. The lumbar vertebra  otherwise normal in alignment and are normal in height. All of the lumbar  intervertebral discs are decreased in height and signal intensity secondary to  degeneration and desiccation.     The conus medullaris terminates at superior endplate of L1 and is normal in  appearance.     At L1-L2 there is a diffuse disc osteophyte complex eccentric to the left and  mild bilateral facet hypertrophy resulting in mild left subarticular stenosis  and mild central canal stenosis. There is moderate left foraminal stenosis been  no significant right foraminal stenosis. The degree of left foraminal stenosis  appears progressed when compared to the prior study.     At L2-L3 there is a diffuse disc osteophyte complex eccentric to the right with  a superimposed broad-based right paracentral to foraminal disc protrusion. This  in combination with ligamentum flavum and facet hypertrophy results in moderate  right subarticular stenosis and mild central canal stenosis. There is moderate  bilateral foraminal stenosis. The disc protrusion appears stable to  "slightly  decreased in size with compared to previous study performed March 19, 2021. The  overall degree of subarticular, canal or foraminal stenosis is not significantly  changed.     At L3-L4 there is a diffuse disc osteophyte complex and bilateral facet  hypertrophy resulting in moderate bilateral subarticular stenosis and moderate  to marked central canal stenosis. There is also moderate right and mild left  foraminal stenosis.     At L4-L5 there is a 1-2 anterolisthesis which results in unroofing of the disc  and diffuse \"pseudobulge\". This in combination with facet hypertrophy results in  severe bilateral subarticular stenosis and severe central canal stenosis. There  is also severe right and moderate left foraminal stenosis at this level.  Findings at this level are not significantly changed.     At L5-S1 there is minimal disc bulging but no significant canal or foraminal  stenosis.     --  IMPRESSION:Multilevel discogenic and degenerative changes of lumbar spine as  described above resulting in varying degrees of canal or foraminal stenosis are  not significantly changed when compared to prior study performed March 19, 2021.    X-RAY LUMBAR SPINE COMPLETE WITH BENDING 01/18/2022  AP, lateral, bilateral oblique, and cone down views of the lumbar spine demonstrate mild scoliosis of the lumbar spine. There are multilevel facet hypertrophy and disc disease throughout the lumbar spine. There is grade 1 anterolisthesis of L4 on L5. On the flexion and extension views, there is persistent grade 1 anterolisthesis of L4 on L5 without significant change compared to prior study. No definite evidence of acute fracture or dislocation.     IMPRESSION:  Grade 1 anterolisthesis of L4 on L5 with multilevel disc disease and facet hypertrophy. No acute fracture or dislocation.     X-rays of the lumbar spine without bending performed 1/12/22  There is moderate disc space narrowing at L3-L4 with vacuum disc. Severe disc space " narrowing is present at L4-L5 with approximately 9 mm grade 1 anterolisthesis of L4 related to L5. There is mild narrowing of the disc space at L5-S1. No spondylolysis. Hypertrophic degenerative facet disease is present at L4-L5 and L5-S1. No fracture, subluxation, or bone lesion. Sacroiliac joints without erosion.    Impression: Severe degenerative disease at L4-L5 with grade 1 anterolisthesis. Degenerative disease is also present at L4-L5, L5-S1.          MR of the lumbar spine performed 3/19/21  Impression:   1. Grade 1 anterolisthesis of L4 over L5 with multifactorial moderate to severe spinal stenosis. There is neural foraminal narrowing and lateral recesses stenosis art this level, with abutent on bilateral exiting L4 nerve roots and impingement on the bilateral exiting L4 nerve roots and impingement on the bilateral traverse L5 nerve roots.  2. Right paracentral disc intrusion at L2-L3 impinges the traversing right L3 nerve root within the lateral recess.   Images were personally reviewed by myself and with the patient.    L1-L2: Disc bulge without substantial spinal canal narrowing. There is mild left neural foraminal narrowing.    L2-L3: Approximately 3 mm retrolisthesis of L2 over L3, with disc bulge and a superimposed right paracentral disc protrusion. There is mild facet arthrosis and slight prominence of the ligamentum flavum. There is narrowing of the right lateral recess with impingement on the traversing right L3 nerve root there spinal canal is overall mildly narrowed. There is mild bilateral neural foraminal narrowing.    L3-L4: mild disc bulge, mild facet arthrosis and slight prominence of the ligamentum flavum, resulting in mild spinal canal narrowing. There is narrowing of the lateral recesses, greater on the left, with crowding of the traversing left L4 nerve root. There is mild to moderate bilateral neural foraminal narrowing.    L4-L5: approximately 7 mm anterolisthesis of L4 over L5, with  disc bulge, advanced facet arthrosis and prominence of the ligamentum flavum. The spinal canal is moderately to severely narrowed. There is bilateral L5 lateral recess stenosis/impingement. There is moderate bilateral neural foraminal narrowing with abutment on the bilateral exiting L4 nerve roots.    L5-S1: Disc bulge and left sided facet arthrosis, without substantial spinal canal narrowing. No substantial neural foraminal narrowing.     Assessment and Plan:    In summary, Roseline Bray is a 68 y.o. female with progressive history of lumbosacral, gluteal, right greater than left posterolateral thigh, foot discomfort.  Her symptoms are suggestive of L4, L5 radiculopathy.  She has concordant radiographic findings of high-grade L4-L5 stenosis on the basis of a grade 1-2 spondylolisthesis.  Upright flexion, extension x-rays of the lumbosacral spine failed to disclose hypermobility.    Her symptoms have been largely manageable as of late.  She remains with a benign neurologic exam.  Surgical intervention may be deferred at present. The natural history of lumbar spondylosis, spondylolisthesis, stenosis was elaborated in great detail.  We have recommended she continue with lifestyle modification, core strengthening, symptomatic medication with anti-inflammatories, muscle relaxants, neuropathic medications (gabapentin, Lyrica), avoidance of excessive bending, twisting, or lifting, unrestrained forces across her axial skeleton.      Surgical intervention would be reserved for failure with the aforementioned treatment options. In the interim should her symptomatology evolve or worsen, I have asked she return sooner for prompt reevaluation.    Thank you for referring Roseline Bray  to my attention.  Please feel free to contact me anytime if I can be of further assistance.        MD ELIANE Pozo Jordan Jupena PA-C, am scribing for, and in the presence of Gerry Schmidt MD.    I, Gerry Schmidt MD, personally  performed the services described in this documentation as scribed by Davy Bonner PA-C  in my presence, and it is accurate and complete.    I spent 30 minutes on this date of service performing the following activities: obtaining history, performing examination, entering orders, documenting, preparing for visit, obtaining / reviewing records, providing counseling and education, independently reviewing study/studies, communicating results and coordinating care.

## 2024-03-27 NOTE — PROGRESS NOTES
[facility]  Medical Office [address], [address]  [place] [address]  Phone: [phone]  Fax: [phone]        24      Re: Roseline Bray  : 1955      Chief Complaint:  Lumbosacral discomfort     History of Present Illness:   Roseline Bray is a 68 y.o. right handed female who presents in follow up neurosurgical consultation for evaluation of her lumbosacral discomfort.  Her symptoms began in 2020 shortly after a left partial knee arthroplasty procedure.  At that time her pain was located principally in the lumbosacral region with extension into the gluteal, upper thigh area, more so on the right.  Her symptoms gradually progressed and she sought medical evaluation in early .    She was referred for multiple conservative modalities of care inclusive of lifestyle modification, medication management, rehabilitative exercise.  Her pain symptoms partially responded to these therapies.  Unfortunately she developed worsening right-sided radicular extension of pain into her posterolateral thigh, shin, dorsum of her foot.  MRI of the lumbosacral spine was obtained disclosing L4-L5 spondylolisthesis, stenosis.  She was referred for injection based procedures thereafter.  She underwent injections in April, , 2021.  She experienced only transient improvement.  Given her failure with traditional modalities of care, she was referred to my attention in secondary consultation.    Since last being seen, she underwent a colonoscopy in 2023. Cancerous polyps were found. She underwent colectomy surgery and 12 rounds of chemotherapy which ended in 2024. Her lumbosacral pain had been minimal through this period.     She returned to work recently and has began with more lumbosacral pain.  Her pain will vacillate in severity between a 3 to 8 out of 10.  Her average level of discomfort is a 4-5 out of 10.   Postural activities can magnify her symptoms. She denies new symptoms of south numbness, weakness, bowel, bladder, gait dysfunction.  She ambulates without the need for an assistive device.  She reports a moderate level of impairment in her activities of daily living secondary to the aforementioned issues.      Medical History:  has no past medical history on file.    Surgical History:  has no past surgical history on file.    Family History: Family history non-contributory to neurological condition.    Social History:   Social History     Socioeconomic History    Marital status:      Spouse name: None    Number of children: None    Years of education: None    Highest education level: None   Tobacco Use    Smoking status: Never    Smokeless tobacco: Never        Allergies:   Allergies   Allergen Reactions    [employer]      Wound glue       Medications:   Current Outpatient Medications   Medication Sig Dispense Refill    clorazepate 3.75 mg tablet Take 3.75 mg by mouth once daily.      cyclobenzaprine 5 mg tablet TAKE 1 TABLET BY MOUTH 3 TIMES DAILY as needed FOR MUSCLE SPASMS      enalapril 10 mg tablet Take 10 mg by mouth once daily.      enalapril 20 mg tablet TAKE 1 TABLET BY MOUTH BY MOUTH DAILY      gabapentin 300 mg capsule TAKE 1 CAPSULE BY MOUTH EVERY DAY AT BEDTIME      meclizine 25 mg tablet TAKE 1 TABLET BY MOUTH TWICE DAILY as needed FOR dizziness      meloxicam 15 mg tablet Take 15 mg by mouth daily as needed.      metaxalone 800 mg tablet Take 800 mg by mouth 3 (three) times a day as needed.      oxyCODONE 5 mg immediate release tablet Take 5 mg by mouth every 4 (four) hours.      topiramate 25 mg tablet Take 25 mg by mouth nightly.      venlafaxine XR 37.5 mg 24 hr capsule Take 37.5 mg by mouth 2 (two) times a day.       No current facility-administered medications for this visit.       Review of Systems:   A 14 point review of systems was performed and aside from what  is mentioned above is otherwise negative.    General physical examination:  The patient is well appearing female, sitting upright in her chair in no acute distress, she appears her stated age.  Her head is atraumatic, normocephalic. Her neck is supple without obvious adenopathy. Oral mucosa is moist. Her peripheral pulses are symmetric and palpable. Extremities without peripheral edema. Her breathing is normal and unlabored.     Vitals:    03/27/24 1424   BP: 132/64   Pulse: 83   Resp: 20   SpO2: 98%        Neurologic examination:  Mental status:  The patient is alert, attentive, and oriented. Speech is clear and fluent with good repetition, comprehension, and naming.  Remote and recent memory are normal as well as fund of knowledge.    Cranial nerves:  CN II: Pupils are equal and briskly reactive to light.   CN III, IV, VI: Extra-occular muscles are intact  CN V: Facial sensation is grossly intact  CN VII: Face is symmetric with normal eye closure and smile.  CN VIII: Hearing is grossly intact  CN IX, X: Phonation is normal.  CN XI: Head turning and shoulder shrug are intact  CN XII: Tongue is midline with normal movements and no atrophy.    Motor:  Muscle bulk and tone are normal.    Deltoid Biceps Triceps Wrist ext Hand  Finger Spread Hip flexion Knee ext Dorsi-  flexion EHL Plantar Flexion   L 5 5 5 5 5 5 5 5 5 5 5   R 5 5 5 5 5 5 5 5 5 5 5     Reflexes:  Reflexes are 2+ and symmetric at the biceps, brachialis, triceps, patellar, and Achilli's. There is no Dumont's sign or ankle clonus.    Sensory:   Intact light touch throughout all 4 extremities.    Coordination:  There is no dysmetria on finger-to-nose testing.  Romberg is absent.    Gait:  Gait is steady with normal steps.  Heel and toe walking are normal. Tandem gait is normal.    MSK: tenderness to palpation over the PSIS.  No pain with steps, minor discomfort with zena's maneuver bilaterally    Data Review:  MRI lumbar spine without contrast,  "6/21/23  CLINICAL HISTORY: Low back pain radiating to right lower extremity.     COMMENT:MRI of the lumbar spine was performed on June 21, 2023 utilizing a 1.5  Karena magnet. Comparison is made to a previous study performed March 19, 2021.     There is a stable grade 1-2  anterolisthesis of L4 on L5. The lumbar vertebra  otherwise normal in alignment and are normal in height. All of the lumbar  intervertebral discs are decreased in height and signal intensity secondary to  degeneration and desiccation.     The conus medullaris terminates at superior endplate of L1 and is normal in  appearance.     At L1-L2 there is a diffuse disc osteophyte complex eccentric to the left and  mild bilateral facet hypertrophy resulting in mild left subarticular stenosis  and mild central canal stenosis. There is moderate left foraminal stenosis been  no significant right foraminal stenosis. The degree of left foraminal stenosis  appears progressed when compared to the prior study.     At L2-L3 there is a diffuse disc osteophyte complex eccentric to the right with  a superimposed broad-based right paracentral to foraminal disc protrusion. This  in combination with ligamentum flavum and facet hypertrophy results in moderate  right subarticular stenosis and mild central canal stenosis. There is moderate  bilateral foraminal stenosis. The disc protrusion appears stable to slightly  decreased in size with compared to previous study performed March 19, 2021. The  overall degree of subarticular, canal or foraminal stenosis is not significantly  changed.     At L3-L4 there is a diffuse disc osteophyte complex and bilateral facet  hypertrophy resulting in moderate bilateral subarticular stenosis and moderate  to marked central canal stenosis. There is also moderate right and mild left  foraminal stenosis.     At L4-L5 there is a 1-2 anterolisthesis which results in unroofing of the disc  and diffuse \"pseudobulge\". This in combination with " facet hypertrophy results in  severe bilateral subarticular stenosis and severe central canal stenosis. There  is also severe right and moderate left foraminal stenosis at this level.  Findings at this level are not significantly changed.     At L5-S1 there is minimal disc bulging but no significant canal or foraminal  stenosis.     --  IMPRESSION:Multilevel discogenic and degenerative changes of lumbar spine as  described above resulting in varying degrees of canal or foraminal stenosis are  not significantly changed when compared to prior study performed March 19, 2021.    X-RAY LUMBAR SPINE COMPLETE WITH BENDING 01/18/2022  AP, lateral, bilateral oblique, and cone down views of the lumbar spine demonstrate mild scoliosis of the lumbar spine. There are multilevel facet hypertrophy and disc disease throughout the lumbar spine. There is grade 1 anterolisthesis of L4 on L5. On the flexion and extension views, there is persistent grade 1 anterolisthesis of L4 on L5 without significant change compared to prior study. No definite evidence of acute fracture or dislocation.     IMPRESSION:  Grade 1 anterolisthesis of L4 on L5 with multilevel disc disease and facet hypertrophy. No acute fracture or dislocation.     X-rays of the lumbar spine without bending performed 1/12/22  There is moderate disc space narrowing at L3-L4 with vacuum disc. Severe disc space narrowing is present at L4-L5 with approximately 9 mm grade 1 anterolisthesis of L4 related to L5. There is mild narrowing of the disc space at L5-S1. No spondylolysis. Hypertrophic degenerative facet disease is present at L4-L5 and L5-S1. No fracture, subluxation, or bone lesion. Sacroiliac joints without erosion.    Impression: Severe degenerative disease at L4-L5 with grade 1 anterolisthesis. Degenerative disease is also present at L4-L5, L5-S1.          MR of the lumbar spine performed 3/19/21  Impression:   1. Grade 1 anterolisthesis of L4 over L5 with  multifactorial moderate to severe spinal stenosis. There is neural foraminal narrowing and lateral recesses stenosis art this level, with abutent on bilateral exiting L4 nerve roots and impingement on the bilateral exiting L4 nerve roots and impingement on the bilateral traverse L5 nerve roots.  2. Right paracentral disc intrusion at L2-L3 impinges the traversing right L3 nerve root within the lateral recess.   Images were personally reviewed by myself and with the patient.    L1-L2: Disc bulge without substantial spinal canal narrowing. There is mild left neural foraminal narrowing.    L2-L3: Approximately 3 mm retrolisthesis of L2 over L3, with disc bulge and a superimposed right paracentral disc protrusion. There is mild facet arthrosis and slight prominence of the ligamentum flavum. There is narrowing of the right lateral recess with impingement on the traversing right L3 nerve root there spinal canal is overall mildly narrowed. There is mild bilateral neural foraminal narrowing.    L3-L4: mild disc bulge, mild facet arthrosis and slight prominence of the ligamentum flavum, resulting in mild spinal canal narrowing. There is narrowing of the lateral recesses, greater on the left, with crowding of the traversing left L4 nerve root. There is mild to moderate bilateral neural foraminal narrowing.    L4-L5: approximately 7 mm anterolisthesis of L4 over L5, with disc bulge, advanced facet arthrosis and prominence of the ligamentum flavum. The spinal canal is moderately to severely narrowed. There is bilateral L5 lateral recess stenosis/impingement. There is moderate bilateral neural foraminal narrowing with abutment on the bilateral exiting L4 nerve roots.    L5-S1: Disc bulge and left sided facet arthrosis, without substantial spinal canal narrowing. No substantial neural foraminal narrowing.     Assessment and Plan:    In summary, Roseline Bray is a 68 y.o. female with progressive history of lumbosacral,  gluteal, right greater than left posterolateral thigh, foot discomfort.  Her symptoms are suggestive of L4, L5 radiculopathy.  She has concordant radiographic findings of high-grade L4-L5 stenosis on the basis of a grade 1-2 spondylolisthesis.  Upright flexion, extension x-rays of the lumbosacral spine failed to disclose hypermobility.    Her symptoms have been largely manageable as of late.  She remains with a benign neurologic exam.  Surgical intervention may be deferred at present. The natural history of lumbar spondylosis, spondylolisthesis, stenosis was elaborated in great detail.  We have recommended she continue with lifestyle modification, core strengthening, symptomatic medication with anti-inflammatories, muscle relaxants, neuropathic medications (gabapentin, Lyrica), avoidance of excessive bending, twisting, or lifting, unrestrained forces across her axial skeleton.      Surgical intervention would be reserved for failure with the aforementioned treatment options. In the interim should her symptomatology evolve or worsen, I have asked she return sooner for prompt reevaluation.    Thank you for referring Roseline Bray  to my attention.  Please feel free to contact me anytime if I can be of further assistance.        MD ELIANE Pozo Jordan Jupena PA-C, am scribing for, and in the presence of Gerry Schmidt MD.    I, Gerry Schmidt MD, personally performed the services described in this documentation as scribed by Davy Bonner PA-C  in my presence, and it is accurate and complete.    I spent 30 minutes on this date of service performing the following activities: obtaining history, performing examination, entering orders, documenting, preparing for visit, obtaining / reviewing records, providing counseling and education, independently reviewing study/studies, communicating results and coordinating care.

## 2024-07-03 ENCOUNTER — PREP FOR PROCEDURE (OUTPATIENT)
Dept: GASTROENTEROLOGY | Facility: CLINIC | Age: 69
End: 2024-07-03

## 2024-07-03 ENCOUNTER — TELEPHONE (OUTPATIENT)
Dept: GASTROENTEROLOGY | Facility: CLINIC | Age: 69
End: 2024-07-03

## 2024-07-03 ENCOUNTER — OFFICE VISIT (OUTPATIENT)
Dept: GASTROENTEROLOGY | Facility: CLINIC | Age: 69
End: 2024-07-03
Payer: MEDICARE

## 2024-07-03 VITALS
BODY MASS INDEX: 28.32 KG/M2 | DIASTOLIC BLOOD PRESSURE: 88 MMHG | SYSTOLIC BLOOD PRESSURE: 126 MMHG | WEIGHT: 170 LBS | HEIGHT: 65 IN

## 2024-07-03 DIAGNOSIS — K59.00 CONSTIPATION, UNSPECIFIED CONSTIPATION TYPE: ICD-10-CM

## 2024-07-03 DIAGNOSIS — Z86.010 HISTORY OF COLON POLYPS: ICD-10-CM

## 2024-07-03 DIAGNOSIS — C18.9 ADENOCARCINOMA OF COLON (HCC): Primary | ICD-10-CM

## 2024-07-03 PROCEDURE — 99214 OFFICE O/P EST MOD 30 MIN: CPT | Performed by: INTERNAL MEDICINE

## 2024-07-03 RX ORDER — LORAZEPAM 0.5 MG/1
0.5 TABLET ORAL EVERY 8 HOURS PRN
COMMUNITY

## 2024-07-03 RX ORDER — POLYETHYLENE GLYCOL 3350 17 G/17G
238 POWDER, FOR SOLUTION ORAL ONCE
Qty: 238 G | Refills: 0 | Status: SHIPPED | OUTPATIENT
Start: 2024-07-03 | End: 2024-07-03

## 2024-07-03 NOTE — LETTER
July 3, 2024     Gilberto Harvey MD  [address]  [address]  [place] [address]    Patient: Adina Valle   YOB: 1955   Date of Visit: 7/3/2024       Dear Dr. Harvey:    Thank you for referring Adina Valle to me for evaluation. Below are my notes for this consultation.    If you have questions, please do not hesitate to call me. I look forward to following your patient along with you.         Sincerely,        Arabella Torres MD        CC: MD Mariano Villaseñor DO Ira Kelberman, MD  7/3/2024  1:36 PM  Sign when Signing Visit  [facility] - Outpatient Follow-up Note  Adina Valle 69 y.o. female MRN: [id number]  Encounter: [id number]    ASSESSMENT AND PLAN:      1. Adenocarcinoma of colon (HCC)  - Colonoscopy; Future  - polyethylene glycol (MiraLax) 17 GM/SCOOP powder; Take 238 g by mouth once for 1 dose Take 238 g my mouth. Use as directed  Dispense: 238 g; Refill: 0  2. History of colon polyps  Adenocarcinoma of the sigmoid colon identified on screening colonoscopy a year ago.  Underwent sigmoid resection and subsequent adjuvant chemotherapy.  Doing well except for some change in bowel habits.  No bleeding pain or thin stools to suggest stricture.  Lost weight during chemotherapy but now regaining.  Due for 1 year surveillance colonoscopy later on this summer.  Schedule colonoscopy at [facility]  MiraLAX prep  - Colonoscopy; Future    3. Constipation, unspecified constipation type  Advised trial of fiber therapy first.  By bulking stools and drinking plenty of fluids that should help to aggregate stools a bit better and stimulate the colon.  If no success with fiber supplementation we then titrate MiraLAX dosing on a regular basis to try to facilitate regular bowel movements.  Metamucil or generic psyllium husk 2 spoons daily  Drink plenty of fluids  High-fiber diet  If no improvement after about a month, would then  use MiraLAX on a regular basis adjusting the dose up or down as needed to facilitate regular stools but not induce diarrhea      Followup Appointment: Pending result of colonoscopy  ______________________________________________________________________    Chief Complaint   Patient presents with   • Follow up-needs to schedule repeat colonoscopy     HPI:  Doing well overall.  Diagnosed with adenocarcinoma of the sigmoid colon on colonoscopy last July.  Had sigmoid resection August 2023.  Having irregular stools since completing chemotherapy.  No pain or bloating, just no stool.  If eats ice cream or takes Miralax has loose stool.  No bleeding.  Appetite is good.  No abdominal pain.  Lost weight after chemo, but now regaling it.   No other new medical issues.      Historical Information  Past Medical History:   Diagnosis Date   • Colon cancer (HCC)    • Depression    • Hyperlipidemia    • Hypertension      Past Surgical History:   Procedure Laterality Date   • APPENDECTOMY      1985   • CATARACT EXTRACTION Bilateral    • DENTAL SURGERY      implants   • KNEE SURGERY Left    • OOPHORECTOMY Right      Social History     Substance and Sexual Activity   Alcohol Use Yes    Comment: socially-rare     Social History     Substance and Sexual Activity   Drug Use Never     Social History     Tobacco Use   Smoking Status Never   Smokeless Tobacco Never     Family History   Problem Relation Age of Onset   • Colon polyps Sister          Current Outpatient Medications:   •  B Complex Vitamins (VITAMIN B-COMPLEX PO)  •  clorazepate (TRANXENE) 3.75 mg tablet  •  enalapril (VASOTEC) 20 mg tablet  •  ezetimibe (ZETIA) 10 mg tablet  •  LORazepam (ATIVAN) 0.5 mg tablet  •  Multiple Vitamins-Minerals (OCUVITE PO)  •  polyethylene glycol (MiraLax) 17 GM/SCOOP powder  •  topiramate (TOPAMAX) 50 MG tablet  •  venlafaxine (EFFEXOR-XR) 150 mg 24 hr capsule  •  VITAMIN D PO  •  furosemide (LASIX) 10 mg/mL oral solution  No Known  "Allergies  Reviewed medications and allergies and updated as indicated    PHYSICAL EXAM:    Blood pressure 126/88, height 5' 5\" (1.651 m), weight 77.1 kg (170 lb). Body mass index is 28.29 kg/m².  General Appearance: NAD, cooperative, alert  Eyes: Anicteric, conjunctiva pink  ENT:  Normocephalic, atraumatic, normal mucosa.    Neck:  Supple, symmetrical, trachea midline  Resp:  Clear to auscultation bilaterally; no rales, rhonchi or wheezing; respirations unlabored   CV:  S1 S2, Regular rate and rhythm; no murmur, rub, or gallop.  GI:  Soft, non-tender, non-distended; normal bowel sounds; no masses, no organomegaly   Rectal: Deferred  Musculoskeletal: No cyanosis, clubbing or edema. Normal ROM.  Skin:  No jaundice, rashes, or lesions   Heme/Lymph: No palpable cervical lymphadenopathy  Psych: Normal affect, good eye contact  Neuro: No gross deficits, AAOx3    Lab Results:   Lab Results   Component Value Date    WBC 4.4 07/18/2023    HGB 13.0 07/18/2023    HCT 36.6 07/18/2023    MCV 86.5 07/18/2023     07/18/2023     Lab Results   Component Value Date    K 4.0 07/18/2023     07/18/2023    CO2 28 07/18/2023    BUN 13 07/18/2023    CREATININE 0.68 07/18/2023    CALCIUM 9.2 07/18/2023    AST 17 07/18/2023    ALT 19 07/18/2023    ALKPHOS 72 07/18/2023    EGFR 95 07/18/2023       Radiology Results:   No results found.    "

## 2024-07-03 NOTE — H&P (VIEW-ONLY)
[facility] - Outpatient Follow-up Note  Adina Valle 69 y.o. female MRN: [id number]  Encounter: [id number]    ASSESSMENT AND PLAN:      1. Adenocarcinoma of colon (HCC)  - Colonoscopy; Future  - polyethylene glycol (MiraLax) 17 GM/SCOOP powder; Take 238 g by mouth once for 1 dose Take 238 g my mouth. Use as directed  Dispense: 238 g; Refill: 0  2. History of colon polyps  Adenocarcinoma of the sigmoid colon identified on screening colonoscopy a year ago.  Underwent sigmoid resection and subsequent adjuvant chemotherapy.  Doing well except for some change in bowel habits.  No bleeding pain or thin stools to suggest stricture.  Lost weight during chemotherapy but now regaining.  Due for 1 year surveillance colonoscopy later on this summer.  Schedule colonoscopy at [facility]  MiraLAX prep  - Colonoscopy; Future    3. Constipation, unspecified constipation type  Advised trial of fiber therapy first.  By bulking stools and drinking plenty of fluids that should help to aggregate stools a bit better and stimulate the colon.  If no success with fiber supplementation we then titrate MiraLAX dosing on a regular basis to try to facilitate regular bowel movements.  Metamucil or generic psyllium husk 2 spoons daily  Drink plenty of fluids  High-fiber diet  If no improvement after about a month, would then use MiraLAX on a regular basis adjusting the dose up or down as needed to facilitate regular stools but not induce diarrhea      Followup Appointment: Pending result of colonoscopy  ______________________________________________________________________    Chief Complaint   Patient presents with    Follow up-needs to schedule repeat colonoscopy     HPI:  Doing well overall.  Diagnosed with adenocarcinoma of the sigmoid colon on colonoscopy last July.  Had sigmoid resection August 2023.  Having irregular stools since completing chemotherapy.  No pain or bloating, just no stool.  If eats  "ice cream or takes Miralax has loose stool.  No bleeding.  Appetite is good.  No abdominal pain.  Lost weight after chemo, but now regaling it.   No other new medical issues.      Historical Information   Past Medical History:   Diagnosis Date    Colon cancer (HCC)     Depression     Hyperlipidemia     Hypertension      Past Surgical History:   Procedure Laterality Date    APPENDECTOMY      1985    CATARACT EXTRACTION Bilateral     DENTAL SURGERY      implants    KNEE SURGERY Left     OOPHORECTOMY Right      Social History     Substance and Sexual Activity   Alcohol Use Yes    Comment: socially-rare     Social History     Substance and Sexual Activity   Drug Use Never     Social History     Tobacco Use   Smoking Status Never   Smokeless Tobacco Never     Family History   Problem Relation Age of Onset    Colon polyps Sister          Current Outpatient Medications:     B Complex Vitamins (VITAMIN B-COMPLEX PO)    clorazepate (TRANXENE) 3.75 mg tablet    enalapril (VASOTEC) 20 mg tablet    ezetimibe (ZETIA) 10 mg tablet    LORazepam (ATIVAN) 0.5 mg tablet    Multiple Vitamins-Minerals (OCUVITE PO)    polyethylene glycol (MiraLax) 17 GM/SCOOP powder    topiramate (TOPAMAX) 50 MG tablet    venlafaxine (EFFEXOR-XR) 150 mg 24 hr capsule    VITAMIN D PO    furosemide (LASIX) 10 mg/mL oral solution  No Known Allergies  Reviewed medications and allergies and updated as indicated    PHYSICAL EXAM:    Blood pressure 126/88, height 5' 5\" (1.651 m), weight 77.1 kg (170 lb). Body mass index is 28.29 kg/m².  General Appearance: NAD, cooperative, alert  Eyes: Anicteric, conjunctiva pink  ENT:  Normocephalic, atraumatic, normal mucosa.    Neck:  Supple, symmetrical, trachea midline  Resp:  Clear to auscultation bilaterally; no rales, rhonchi or wheezing; respirations unlabored   CV:  S1 S2, Regular rate and rhythm; no murmur, rub, or gallop.  GI:  Soft, non-tender, non-distended; normal bowel sounds; no masses, no organomegaly "   Rectal: Deferred  Musculoskeletal: No cyanosis, clubbing or edema. Normal ROM.  Skin:  No jaundice, rashes, or lesions   Heme/Lymph: No palpable cervical lymphadenopathy  Psych: Normal affect, good eye contact  Neuro: No gross deficits, AAOx3    Lab Results:   Lab Results   Component Value Date    WBC 4.4 07/18/2023    HGB 13.0 07/18/2023    HCT 36.6 07/18/2023    MCV 86.5 07/18/2023     07/18/2023     Lab Results   Component Value Date    K 4.0 07/18/2023     07/18/2023    CO2 28 07/18/2023    BUN 13 07/18/2023    CREATININE 0.68 07/18/2023    CALCIUM 9.2 07/18/2023    AST 17 07/18/2023    ALT 19 07/18/2023    ALKPHOS 72 07/18/2023    EGFR 95 07/18/2023       Radiology Results:   No results found.

## 2024-07-03 NOTE — PATIENT INSTRUCTIONS
Schedule colonoscopy at [facility].  Metamucil or generic psyllium husk 2 spoons daily  Drink plenty of fluids  High-fiber diet  If no improvement after about a month, would then use MiraLAX on a regular basis adjusting the dose up or down as needed to facilitate regular stools but not induce diarrhea

## 2024-07-03 NOTE — PROGRESS NOTES
[facility] - Outpatient Follow-up Note  Adina Valle 69 y.o. female MRN: [id number]  Encounter: [id number]    ASSESSMENT AND PLAN:      1. Adenocarcinoma of colon (HCC)  - Colonoscopy; Future  - polyethylene glycol (MiraLax) 17 GM/SCOOP powder; Take 238 g by mouth once for 1 dose Take 238 g my mouth. Use as directed  Dispense: 238 g; Refill: 0  2. History of colon polyps  Adenocarcinoma of the sigmoid colon identified on screening colonoscopy a year ago.  Underwent sigmoid resection and subsequent adjuvant chemotherapy.  Doing well except for some change in bowel habits.  No bleeding pain or thin stools to suggest stricture.  Lost weight during chemotherapy but now regaining.  Due for 1 year surveillance colonoscopy later on this summer.  Schedule colonoscopy at [facility]  MiraLAX prep  - Colonoscopy; Future    3. Constipation, unspecified constipation type  Advised trial of fiber therapy first.  By bulking stools and drinking plenty of fluids that should help to aggregate stools a bit better and stimulate the colon.  If no success with fiber supplementation we then titrate MiraLAX dosing on a regular basis to try to facilitate regular bowel movements.  Metamucil or generic psyllium husk 2 spoons daily  Drink plenty of fluids  High-fiber diet  If no improvement after about a month, would then use MiraLAX on a regular basis adjusting the dose up or down as needed to facilitate regular stools but not induce diarrhea      Followup Appointment: Pending result of colonoscopy  ______________________________________________________________________    Chief Complaint   Patient presents with    Follow up-needs to schedule repeat colonoscopy     HPI:  Doing well overall.  Diagnosed with adenocarcinoma of the sigmoid colon on colonoscopy last July.  Had sigmoid resection August 2023.  Having irregular stools since completing chemotherapy.  No pain or bloating, just no stool.  If eats  "ice cream or takes Miralax has loose stool.  No bleeding.  Appetite is good.  No abdominal pain.  Lost weight after chemo, but now regaling it.   No other new medical issues.      Historical Information   Past Medical History:   Diagnosis Date    Colon cancer (HCC)     Depression     Hyperlipidemia     Hypertension      Past Surgical History:   Procedure Laterality Date    APPENDECTOMY      1985    CATARACT EXTRACTION Bilateral     DENTAL SURGERY      implants    KNEE SURGERY Left     OOPHORECTOMY Right      Social History     Substance and Sexual Activity   Alcohol Use Yes    Comment: socially-rare     Social History     Substance and Sexual Activity   Drug Use Never     Social History     Tobacco Use   Smoking Status Never   Smokeless Tobacco Never     Family History   Problem Relation Age of Onset    Colon polyps Sister          Current Outpatient Medications:     B Complex Vitamins (VITAMIN B-COMPLEX PO)    clorazepate (TRANXENE) 3.75 mg tablet    enalapril (VASOTEC) 20 mg tablet    ezetimibe (ZETIA) 10 mg tablet    LORazepam (ATIVAN) 0.5 mg tablet    Multiple Vitamins-Minerals (OCUVITE PO)    polyethylene glycol (MiraLax) 17 GM/SCOOP powder    topiramate (TOPAMAX) 50 MG tablet    venlafaxine (EFFEXOR-XR) 150 mg 24 hr capsule    VITAMIN D PO    furosemide (LASIX) 10 mg/mL oral solution  No Known Allergies  Reviewed medications and allergies and updated as indicated    PHYSICAL EXAM:    Blood pressure 126/88, height 5' 5\" (1.651 m), weight 77.1 kg (170 lb). Body mass index is 28.29 kg/m².  General Appearance: NAD, cooperative, alert  Eyes: Anicteric, conjunctiva pink  ENT:  Normocephalic, atraumatic, normal mucosa.    Neck:  Supple, symmetrical, trachea midline  Resp:  Clear to auscultation bilaterally; no rales, rhonchi or wheezing; respirations unlabored   CV:  S1 S2, Regular rate and rhythm; no murmur, rub, or gallop.  GI:  Soft, non-tender, non-distended; normal bowel sounds; no masses, no organomegaly "   Rectal: Deferred  Musculoskeletal: No cyanosis, clubbing or edema. Normal ROM.  Skin:  No jaundice, rashes, or lesions   Heme/Lymph: No palpable cervical lymphadenopathy  Psych: Normal affect, good eye contact  Neuro: No gross deficits, AAOx3    Lab Results:   Lab Results   Component Value Date    WBC 4.4 07/18/2023    HGB 13.0 07/18/2023    HCT 36.6 07/18/2023    MCV 86.5 07/18/2023     07/18/2023     Lab Results   Component Value Date    K 4.0 07/18/2023     07/18/2023    CO2 28 07/18/2023    BUN 13 07/18/2023    CREATININE 0.68 07/18/2023    CALCIUM 9.2 07/18/2023    AST 17 07/18/2023    ALT 19 07/18/2023    ALKPHOS 72 07/18/2023    EGFR 95 07/18/2023       Radiology Results:   No results found.

## 2024-07-03 NOTE — TELEPHONE ENCOUNTER
Scheduled date of colonoscopy (as of today): 07/23/2024  Physician performing colonoscopy:Dr Torres  Location of colonoscopy: [facility]  Bowel prep reviewed with patient: Miralax/Dulcolax  Instructions given to patient by: gi  Clearances: none

## 2024-07-09 ENCOUNTER — ANESTHESIA (OUTPATIENT)
Dept: ANESTHESIOLOGY | Facility: AMBULATORY SURGERY CENTER | Age: 69
End: 2024-07-09

## 2024-07-09 ENCOUNTER — ANESTHESIA EVENT (OUTPATIENT)
Dept: ANESTHESIOLOGY | Facility: AMBULATORY SURGERY CENTER | Age: 69
End: 2024-07-09

## 2024-07-15 ENCOUNTER — TELEPHONE (OUTPATIENT)
Dept: GASTROENTEROLOGY | Facility: CLINIC | Age: 69
End: 2024-07-15

## 2024-07-15 NOTE — TELEPHONE ENCOUNTER
Procedure confirmed  Colonoscopy     Via: [employer]    Instructions given: Given to Patient at Visit     Prep Given: Miralax/Dulcolax    Call the office if there are any questions.

## 2024-07-23 ENCOUNTER — HOSPITAL ENCOUNTER (OUTPATIENT)
Dept: GASTROENTEROLOGY | Facility: AMBULATORY SURGERY CENTER | Age: 69
Discharge: HOME | End: 2024-07-23
Payer: MEDICARE

## 2024-07-23 ENCOUNTER — ANESTHESIA EVENT (OUTPATIENT)
Dept: GASTROENTEROLOGY | Facility: AMBULATORY SURGERY CENTER | Age: 69
End: 2024-07-23

## 2024-07-23 ENCOUNTER — ANESTHESIA (OUTPATIENT)
Dept: GASTROENTEROLOGY | Facility: AMBULATORY SURGERY CENTER | Age: 69
End: 2024-07-23

## 2024-07-23 VITALS
WEIGHT: 170 LBS | RESPIRATION RATE: 15 BRPM | DIASTOLIC BLOOD PRESSURE: 66 MMHG | TEMPERATURE: 96.6 F | BODY MASS INDEX: 28.32 KG/M2 | SYSTOLIC BLOOD PRESSURE: 125 MMHG | HEIGHT: 65 IN | HEART RATE: 80 BPM | OXYGEN SATURATION: 99 %

## 2024-07-23 DIAGNOSIS — C18.9 ADENOCARCINOMA OF COLON (HCC): ICD-10-CM

## 2024-07-23 DIAGNOSIS — Z86.010 HISTORY OF COLON POLYPS: ICD-10-CM

## 2024-07-23 PROBLEM — E78.5 HYPERLIPIDEMIA: Status: ACTIVE | Noted: 2024-07-23

## 2024-07-23 PROBLEM — Z98.890 PONV (POSTOPERATIVE NAUSEA AND VOMITING): Status: ACTIVE | Noted: 2024-07-23

## 2024-07-23 PROBLEM — F32.A DEPRESSION: Status: ACTIVE | Noted: 2024-07-23

## 2024-07-23 PROBLEM — R11.2 PONV (POSTOPERATIVE NAUSEA AND VOMITING): Status: ACTIVE | Noted: 2024-07-23

## 2024-07-23 PROBLEM — I10 HTN (HYPERTENSION): Status: ACTIVE | Noted: 2024-07-23

## 2024-07-23 PROCEDURE — G0105 COLORECTAL SCRN; HI RISK IND: HCPCS | Performed by: INTERNAL MEDICINE

## 2024-07-23 RX ORDER — PROPOFOL 10 MG/ML
INJECTION, EMULSION INTRAVENOUS AS NEEDED
Status: DISCONTINUED | OUTPATIENT
Start: 2024-07-23 | End: 2024-07-23

## 2024-07-23 RX ORDER — SODIUM CHLORIDE, SODIUM LACTATE, POTASSIUM CHLORIDE, CALCIUM CHLORIDE 600; 310; 30; 20 MG/100ML; MG/100ML; MG/100ML; MG/100ML
50 INJECTION, SOLUTION INTRAVENOUS CONTINUOUS
Status: DISCONTINUED | OUTPATIENT
Start: 2024-07-23 | End: 2024-07-27 | Stop reason: HOSPADM

## 2024-07-23 RX ORDER — LIDOCAINE HYDROCHLORIDE 10 MG/ML
INJECTION, SOLUTION EPIDURAL; INFILTRATION; INTRACAUDAL; PERINEURAL AS NEEDED
Status: DISCONTINUED | OUTPATIENT
Start: 2024-07-23 | End: 2024-07-23

## 2024-07-23 RX ADMIN — SODIUM CHLORIDE, SODIUM LACTATE, POTASSIUM CHLORIDE, CALCIUM CHLORIDE 50 ML/HR: 600; 310; 30; 20 INJECTION, SOLUTION INTRAVENOUS at 08:04

## 2024-07-23 RX ADMIN — PROPOFOL 20 MG: 10 INJECTION, EMULSION INTRAVENOUS at 08:39

## 2024-07-23 RX ADMIN — PROPOFOL 150 MG: 10 INJECTION, EMULSION INTRAVENOUS at 08:34

## 2024-07-23 RX ADMIN — LIDOCAINE HYDROCHLORIDE 50 MG: 10 INJECTION, SOLUTION EPIDURAL; INFILTRATION; INTRACAUDAL; PERINEURAL at 08:33

## 2024-07-23 RX ADMIN — PROPOFOL 20 MG: 10 INJECTION, EMULSION INTRAVENOUS at 08:45

## 2024-07-23 RX ADMIN — PROPOFOL 1 MG: 10 INJECTION, EMULSION INTRAVENOUS at 08:42

## 2024-07-23 RX ADMIN — PROPOFOL 20 MG: 10 INJECTION, EMULSION INTRAVENOUS at 08:49

## 2024-07-23 NOTE — ANESTHESIA PREPROCEDURE EVALUATION
Procedure:  COLONOSCOPY    Relevant Problems   ANESTHESIA   (+) PONV (postoperative nausea and vomiting)      CARDIO   (+) HTN (hypertension)   (+) Hyperlipidemia      NEURO/PSYCH   (+) Depression        Physical Exam    Airway    Mallampati score: II  TM Distance: <3 FB  Neck ROM: full     Dental   Comment: None loose, No notable dental hx     Cardiovascular      Pulmonary      Other Findings  post-pubertal.      Anesthesia Plan  ASA Score- 2     Anesthesia Type- IV sedation with anesthesia with ASA Monitors.         Additional Monitors:     Airway Plan:     Comment: Last of PO bowel prep: 03:45    Patient educated on the possibility for awareness under sedation and of the possibility of airway intervention in the event of an airway or procedural emergency  .       Plan Factors-Exercise tolerance (METS): >4 METS.    Chart reviewed.    Patient summary reviewed.    Patient is not a current smoker.              Induction- intravenous.    Postoperative Plan-         Informed Consent- Anesthetic plan and risks discussed with patient.  I personally reviewed this patient with the CRNA. Discussed and agreed on the Anesthesia Plan with the CRNA..

## 2024-07-23 NOTE — INTERVAL H&P NOTE
H&P reviewed. After examining the patient I find no changes in the patients condition since the H&P had been written.    Vitals:    07/23/24 0755   BP: 126/79   Pulse: 97   Resp: 16   Temp: (!) 96.6 °F (35.9 °C)   SpO2: 95%

## 2024-08-20 ENCOUNTER — OFFICE VISIT (OUTPATIENT)
Dept: OBGYN CLINIC | Facility: CLINIC | Age: 69
End: 2024-08-20
Payer: MEDICARE

## 2024-08-20 VITALS
DIASTOLIC BLOOD PRESSURE: 78 MMHG | BODY MASS INDEX: 28.32 KG/M2 | HEART RATE: 100 BPM | SYSTOLIC BLOOD PRESSURE: 116 MMHG | WEIGHT: 170 LBS | HEIGHT: 65 IN

## 2024-08-20 DIAGNOSIS — R20.2 PARESTHESIAS IN LEFT HAND: ICD-10-CM

## 2024-08-20 DIAGNOSIS — M79.641 RIGHT HAND PAIN: Primary | ICD-10-CM

## 2024-08-20 DIAGNOSIS — M79.642 LEFT HAND PAIN: ICD-10-CM

## 2024-08-20 DIAGNOSIS — R20.2 PARESTHESIAS IN RIGHT HAND: ICD-10-CM

## 2024-08-20 DIAGNOSIS — R22.32 NODULE OF FINGER, LEFT: ICD-10-CM

## 2024-08-20 PROCEDURE — 99203 OFFICE O/P NEW LOW 30 MIN: CPT | Performed by: PHYSICIAN ASSISTANT

## 2024-08-20 NOTE — PATIENT INSTRUCTIONS
Patient Education     Carpal Tunnel Exercises   About this topic   Carpal tunnel syndrome is a very common health problem. It is most often caused by doing hand or wrist movements over and over. It can also be caused by using the lower arm muscles too much.  The carpal tunnel is the small area in your wrist that your median nerve runs through. A tough band of tissues called a ligament holds everything in place over the carpal tunnel. Your median nerve runs from your neck through your lower arm into your hand. If this nerve is squeezed at the wrist area, you may feel pain and have other signs. Your hand, fingers, and wrist may feel weak, numb, or tingly. This is called carpal tunnel syndrome.  Your doctor may want you to try exercises to help your signs. Other times, you will do these exercises after surgery.  General   Before starting with a program, ask your doctor if you are healthy enough to do these exercises. Your doctor may have you work with a  or physical therapist to make a safe exercise program to meet your needs.  Stretching Exercises   Stretching exercises keep your muscles flexible. They also stop them from getting tight. Start by doing each of these stretches 2 to 3 times. In order for your body to make changes, you will need to hold these stretches for 20 to 30 seconds. Repeat each exercise 2 to 3 times each day. Do all exercises slowly.  Wrist stretches bending back ? Straighten your elbow and have your palm facing up. Keeping your elbow straight, bend your wrist back so that your fingers are now pointing to the floor. Grab your hand with your other hand and push back the wrist until you feel a stretch. If you just had surgery, you should not do this exercise until your therapist or doctor tells you it is OK.  Strengthening Exercises   Strengthening exercises keep your muscles firm and strong. Sit while doing these exercises. Be sure to use good posture. Start by repeating each exercise 2 to 3  times. Work up to doing each exercise 10 times. Try to do the exercises 2 to 3 times each day. Do all exercises slowly.  Tendon gliding exercises using 4 positions ? Start by holding your hand with your fingers straight. Then, bend only the last two joints of your fingers and move your fingers into a hook or claw position. Next, straighten your fingers and bend your knuckles to make a flat table top position. This is also called the duckbill position. Last, make a full fist. Moving your hand into all 4 positions is one exercise.  Wrist exercises:  Side-to-side ? Hold one arm still using your other hand. Move your hand from side to side.  Up and down ? Hold one arm still using your other hand. Bend your wrist up and down.  Wrist circles ? Move each wrist in a [place] in one direction. Now, move each wrist in a [place] in the other direction.           What will the results be?   Less pain, pressure, stiffness, and swelling in your wrist and hand  Ease numbness and tingling in your hand and fingers  Increased blood flow to the nerves, muscles, and joints of your wrist and hand to help healing  Increased hand and  strength  Keep your muscles and joints strong and flexible  Helpful tips   Stay active and work out to keep your muscles strong and flexible.  Be sure you do not hold your breath when exercising. This can raise your blood pressure. If you tend to hold your breath, try counting out loud when exercising. If any exercise bothers you, stop right away.  Always warm up before stretching. Heated muscles stretch much easier than cool muscles. Stretching cool muscles can lead to injury.  Try walking and swinging your arms at an easy pace for a few minutes to warm up your muscles. Do this again after exercising.  Never bounce when doing stretches.  Doing exercises before a meal may be a good way to get into a routine.  Exercise may be slightly uncomfortable, but you should not have sharp pains. If you do get sharp  pains, stop what you are doing. If the sharp pains continue, call your doctor.  Last Reviewed Date   2021-05-10  Consumer Information Use and Disclaimer   This generalized information is a limited summary of diagnosis, treatment, and/or medication information. It is not meant to be comprehensive and should be used as a tool to help the user understand and/or assess potential diagnostic and treatment options. It does NOT include all information about conditions, treatments, medications, side effects, or risks that may apply to a specific patient. It is not intended to be medical advice or a substitute for the medical advice, diagnosis, or treatment of a health care provider based on the health care provider's examination and assessment of a patient’s specific and unique circumstances. Patients must speak with a health care provider for complete information about their health, medical questions, and treatment options, including any risks or benefits regarding use of medications. This information does not endorse any treatments or medications as safe, effective, or approved for treating a specific patient. Up[employer], Inc[employer] and its affiliates disclaim any warranty or liability relating to this information or the use thereof. The use of this information is governed by the Terms of Use, available at https://www.w[employer]Kernel Corporationuwer.com/en/know/clinical-effectiveness-terms   Copyright   Copyright © 2024 [employer]Date, Inc[employer] and its affiliates and/or licensors. All rights reserved.

## 2024-08-20 NOTE — PROGRESS NOTES
Orthopaedic Surgery - Office Note  Adina Vlale (69 y.o. female)   : 1955   MRN: [id number]  Encounter Date: 2024    Chief Complaint   Patient presents with    Right Hand - Numbness, Pain    Left Hand - Pain, Numbness         Assessment/Plan  Diagnoses and all orders for this visit:    Right hand pain  -     Durable Medical Equipment  -     US MSK limited; Future  -     Ambulatory Referral to Orthopedic Surgery; Future    Left hand pain  -     Durable Medical Equipment  -     US MSK limited; Future  -     Ambulatory Referral to Orthopedic Surgery; Future    Paresthesias in right hand  -     Durable Medical Equipment  -     US MSK limited; Future  -     Ambulatory Referral to Orthopedic Surgery; Future    Paresthesias in left hand  -     Durable Medical Equipment  -     US MSK limited; Future  -     Ambulatory Referral to Orthopedic Surgery; Future    Nodule of small finger, left    The carpal tunnel syndrome diagnosis was reviewed with the patient in the office today.  Initial treatment recommendations would consist of nighttime carpal tunnel splinting.  Oral anti-inflammatory such as Aleve 1 tablet twice daily with food stopping and calling if any stomach upset occurs.  Patient will be provided a physician directed home exercise program for carpal tunnel syndrome and will be available in the after visit summary.  In addition patient will be sent for an ultrasound of the wrist to confirm the diagnosis.  Patient will follow-up with a hand surgeon to discuss the success/failure of the conservative treatments as well as the ultrasound results.  All question concerns were answered in the office today.    Patient has a small nodule on the dorsal aspect of her PIP joint of the left small finger that she may discuss surgical removal with her hand surgeon as she does not like it.       Return for Recheck with hand surgeon to discuss ultrasound results.        History of Present Illness  This is a new  "patient with reported numbness and tingling in the right and left hand.  She is right-hand dominant.  Her symptoms are greater on the left compared to the right.  She reports that the symptoms have been present to some degree for an extended period of time but have gotten worse over the past 2 weeks.  She reports that she completed chemotherapy in February and her oncologist does not believe it is neuropathy from the medication.  Patient reports the symptoms primarily involve the thumb index and middle finger.  There is numbness and weakness.  She has noticed dropping items such as the pills in the morning and has difficulty holding a glass of iced tea.  Her symptoms are worse in the morning and it often wakes her up at night.  She has not tried any treatment other than some gentle stretching exercises.    Review of Systems  Pertinent items are noted in HPI.  All other systems were reviewed and are negative.    Physical Exam  /78 (BP Location: Right arm, Patient Position: Sitting, Cuff Size: Large)   Pulse 100   Ht 5' 5\" (1.651 m)   Wt 77.1 kg (170 lb)   BMI 28.29 kg/m²   Cons: Appears well.  No apparent distress.  Psych: Alert. Oriented x3.  Mood and affect normal.  Patient's left hand is without skin breakdown lesion or signs of infection.  There is no thenar atrophy or wasting.  She has a positive Tinel's at the carpal tunnel and a positive phalanx at less than 10 seconds.  She has decreased sensation on the palmar aspect of the thumb only.   strength and pinch strength is decreased to 4 out of 5.  She has a nodule at the DIP joint of the small finger dorsally that is firm and nontender.  Distal radial and ulnar pulses are +2.  She has full range of motion of all digits and wrist.  There are no trigger fingers or Dupuytren's contractures appreciated.  Her elbow exam is unremarkable    Patient's right hand is without skin breakdown lesion or signs of infection.  There is no thenar atrophy or wasting. "  She has a positive Tinel's at the carpal tunnel and a positive phalanx at less than 15 seconds.  She has decreased sensation on the palmar aspect of the thumb only.   strength and pinch strength is decreased to 4 out of 5.  She has a nodule at the DIP joint of the small finger dorsally that is firm and nontender.  Distal radial and ulnar pulses are +2.  She has full range of motion of all digits and wrist.  There are no trigger fingers or Dupuytren's contractures appreciated.  Her elbow exam is unremarkable.          Studies Reviewed  X-rays not indicated at this time    Procedures  No procedures today.    Medical, Surgical, Family, and Social History  The patient's medical history, family history, and social history, were reviewed and updated as appropriate.    Past Medical History:   Diagnosis Date    Colon cancer (HCC)     Depression     Hyperlipidemia     Hypertension     PONV (postoperative nausea and vomiting)        Past Surgical History:   Procedure Laterality Date    APPENDECTOMY      1985    CATARACT EXTRACTION Bilateral     COLON SURGERY      COLONOSCOPY      DENTAL SURGERY      implants    JOINT REPLACEMENT Left     partial knee    KNEE SURGERY Left     OOPHORECTOMY Right        Family History   Problem Relation Age of Onset    Colon polyps Sister        Social History     Occupational History    Not on file   Tobacco Use    Smoking status: Never    Smokeless tobacco: Never   Vaping Use    Vaping status: Never Used   Substance and Sexual Activity    Alcohol use: Yes     Comment: socially-rare    Drug use: Never    Sexual activity: Not on file       No Known Allergies      Current Outpatient Medications:     B Complex Vitamins (VITAMIN B-COMPLEX PO), Take by mouth, Disp: , Rfl:     enalapril (VASOTEC) 20 mg tablet, Take 20 mg by mouth daily, Disp: , Rfl:     ezetimibe (ZETIA) 10 mg tablet, Take 10 mg by mouth daily, Disp: , Rfl:     LORazepam (ATIVAN) 0.5 mg tablet, Take 0.5 mg by mouth every 8  (eight) hours as needed for anxiety, Disp: , Rfl:     Multiple Vitamins-Minerals (OCUVITE PO), Take by mouth, Disp: , Rfl:     topiramate (TOPAMAX) 50 MG tablet, Take 50 mg by mouth every 12 (twelve) hours, Disp: , Rfl:     venlafaxine (EFFEXOR-XR) 150 mg 24 hr capsule, Take 150 mg by mouth daily, Disp: , Rfl:     VITAMIN D PO, Take by mouth, Disp: , Rfl:     clorazepate (TRANXENE) 3.75 mg tablet, Take 3.75 mg by mouth as needed, Disp: , Rfl:     furosemide (LASIX) 10 mg/mL oral solution, Take 10 mg by mouth daily (Patient not taking: Reported on 7/3/2024), Disp: , Rfl:       Kevin Holden PA-C

## 2024-08-30 ENCOUNTER — HOSPITAL ENCOUNTER (OUTPATIENT)
Dept: ULTRASOUND IMAGING | Facility: HOSPITAL | Age: 69
Discharge: HOME | End: 2024-08-30
Payer: MEDICARE

## 2024-08-30 DIAGNOSIS — M79.641 RIGHT HAND PAIN: ICD-10-CM

## 2024-08-30 DIAGNOSIS — R20.2 PARESTHESIAS IN RIGHT HAND: ICD-10-CM

## 2024-08-30 DIAGNOSIS — M79.642 LEFT HAND PAIN: ICD-10-CM

## 2024-08-30 DIAGNOSIS — R20.0 BILATERAL HAND NUMBNESS: ICD-10-CM

## 2024-08-30 DIAGNOSIS — R20.2 PARESTHESIAS IN LEFT HAND: ICD-10-CM

## 2024-08-30 PROCEDURE — 76882 US LMTD JT/FCL EVL NVASC XTR: CPT

## 2024-09-23 ENCOUNTER — OFFICE VISIT (OUTPATIENT)
Dept: OBGYN CLINIC | Facility: CLINIC | Age: 69
End: 2024-09-23
Payer: MEDICARE

## 2024-09-23 ENCOUNTER — PREP FOR PROCEDURE (OUTPATIENT)
Dept: OBGYN CLINIC | Facility: CLINIC | Age: 69
End: 2024-09-23

## 2024-09-23 VITALS — BODY MASS INDEX: 30.92 KG/M2 | HEIGHT: 65 IN | WEIGHT: 185.6 LBS

## 2024-09-23 DIAGNOSIS — R20.2 PARESTHESIAS IN RIGHT HAND: ICD-10-CM

## 2024-09-23 DIAGNOSIS — M79.641 RIGHT HAND PAIN: ICD-10-CM

## 2024-09-23 DIAGNOSIS — G56.02 CARPAL TUNNEL SYNDROME ON LEFT: Primary | ICD-10-CM

## 2024-09-23 DIAGNOSIS — R20.2 PARESTHESIAS IN LEFT HAND: ICD-10-CM

## 2024-09-23 DIAGNOSIS — M79.642 LEFT HAND PAIN: ICD-10-CM

## 2024-09-23 PROCEDURE — 99213 OFFICE O/P EST LOW 20 MIN: CPT | Performed by: PHYSICIAN ASSISTANT

## 2024-09-23 RX ORDER — LIDOCAINE HYDROCHLORIDE AND EPINEPHRINE 10; 10 MG/ML; UG/ML
20 INJECTION, SOLUTION INFILTRATION; PERINEURAL ONCE
OUTPATIENT
Start: 2024-09-23 | End: 2024-09-23

## 2024-09-23 NOTE — PROGRESS NOTES
ASSESSMENT/PLAN:    Assessment:   Bilateral carpal tunnel syndrome      Plan:   Treatment options were discussed in the form of continued nighttime bracing, a carpal tunnel CSI or a carpal tunnel release.   Risks and benefits of a endoscopic carpal tunnel release was discussed at length and are listed below.   Debi elected to proceed with a left endoscopic carpal tunnel release under local and informed electronic surgical consent was signed.   Post operative instructions were reviewed, avoid heavy lifting for approx. 2 weeks post op. 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 Typing speed and pinch strength return was reviewed.   Follow up in the office 10-14 days post op for suture removal and to re-evaluate right carpal tunnel syndrome.     Follow Up:  After Surgery    To Do Next Visit:  Sutures out      Operative Discussions:     Endoscopic Carpal Tunnel Release:   The anatomy and physiology of carpal tunnel syndrome was discussed with the patient today.  Increase pressure localized under the transverse carpal ligament can cause pain, numbness, tingling, or dysesthesias within the median nerve distribution as well as feelings of fatigue, clumsiness, or awkwardness.  These symptoms typically occur at night and worse in the morning upon waking.  Eventually, untreated carpal tunnel syndrome can result in weakness and permanent loss of muscle within the thenar compartment of the hand.  Treatment options were discussed with the patient.  Conservative treatment includes nocturnal resting splints to keep the nerve in a neutral position, ergonomic changes within the work or home environment, activity modification, and tendon gliding exercises.  Steroid injections within the carpal canal can help a majority of patients, however this is often self-limited in a majority of patients.  Surgical intervention to divide the transverse carpal ligament typically results in a long-lasting relief of the patient's complaints,  with the recurrence rate of less than 1%. The patient has elected to undergo an endoscopic carpal tunnel release.  The 2 incision technique was discussed with the patient, which results in approximately a two-week less recovery time, and less wound complications.  In the postoperative period, light activities are allowed immediately, driving is allowed when narcotic medication has stopped, and the bandages may be removed and incision may get wet after 2 days.  Heavy activities (lifting more than approximately 10 pounds) will be allowed after follow up appointment in 1-2 weeks.  While the pain and discomfort in the hands generally improves rapidly, the numbness and tingling as well as the strength will slowly improve over weeks to months depending on the severity of the carpal tunnel syndrome.  Pillar pain was discussed with the patient, which is typically a common but self-limiting condition.  The risks of bleeding and infection from the surgery are less than 1%.  Risk of recurrence is approximately 0.5%.  The risks of nerve injury or nerve damage or damage to the blood vessels is approximately 1 in 1200. The patient has an understanding of the above mentioned discussion.The risks and benefits of the procedure were explained to the patient, which include, but are not limited to: Bleeding, infection, recurrence, pain, scar, damage to tendons, damage to nerves, and damage to blood vessels, failure to give desired results and complications related to anesthesia.  These risks, along with alternative conservative treatment options, and postoperative protocols were voiced back and understood by the patient.  All questions were answered to the patient's satisfaction.  The patient agrees to comply with a standard postoperative protocol, and is willing to proceed.  Education was provided via written and auditory forms.  There were no barriers to learning. Written handouts regarding wound care, incision and scar care, and  general preoperative information was provided to the patient.  Prior to surgery, the patient may be requested to stop all anti-inflammatory medications.  Prophylactic aspirin, Plavix, and Coumadin may be allowed to be continued.  Medications including vitamin E., ginkgo, and fish oil are requested to be stopped approximately one week prior to surgery.  Hypertensive medications and beta blockers, if taken, should be continued.    _____________________________________________________  CHIEF COMPLAINT:  Chief Complaint   Patient presents with    Right Wrist - Carpal Tunnel     US- 8/30/24    Left Wrist - Carpal Tunnel     US- 8/30/24         SUBJECTIVE:  Adina Valle is a 69 y.o. female who presents to the office for bilateral carpal tunnel syndrome and a left small finger mass. I am seeing Debi in consultation at the request of Kevin Holden PA-C. She notes bilateral hand numbness and tingling, left worse then her right. She feels this has worsened over the past month or two. She also notes a mass to her left small finger, about her PIP joint, which she feels has migrated to the radial aspect of the finger rather then the ulnar aspect of the finger. She has tried nighttime bracing but notes this did cause increased pain.    Work status: desk/typing     PAST MEDICAL HISTORY:  Past Medical History:   Diagnosis Date    Colon cancer (HCC)     Depression     Hyperlipidemia     Hypertension     PONV (postoperative nausea and vomiting)        PAST SURGICAL HISTORY:  Past Surgical History:   Procedure Laterality Date    APPENDECTOMY      1985    CATARACT EXTRACTION Bilateral     COLON SURGERY      COLONOSCOPY      DENTAL SURGERY      implants    JOINT REPLACEMENT Left     partial knee    KNEE SURGERY Left     OOPHORECTOMY Right        FAMILY HISTORY:  Family History   Problem Relation Age of Onset    Colon polyps Sister        SOCIAL HISTORY:  Social History     Tobacco Use    Smoking status: Never    Smokeless  "tobacco: Never   Vaping Use    Vaping status: Never Used   Substance Use Topics    Alcohol use: Yes     Comment: socially-rare    Drug use: Never       MEDICATIONS:    Current Outpatient Medications:     B Complex Vitamins (VITAMIN B-COMPLEX PO), Take by mouth, Disp: , Rfl:     enalapril (VASOTEC) 20 mg tablet, Take 20 mg by mouth daily, Disp: , Rfl:     ezetimibe (ZETIA) 10 mg tablet, Take 10 mg by mouth daily, Disp: , Rfl:     LORazepam (ATIVAN) 0.5 mg tablet, Take 0.5 mg by mouth every 8 (eight) hours as needed for anxiety, Disp: , Rfl:     Multiple Vitamins-Minerals (OCUVITE PO), Take by mouth, Disp: , Rfl:     topiramate (TOPAMAX) 50 MG tablet, Take 50 mg by mouth every 12 (twelve) hours, Disp: , Rfl:     venlafaxine (EFFEXOR-XR) 150 mg 24 hr capsule, Take 150 mg by mouth daily, Disp: , Rfl:     VITAMIN D PO, Take by mouth, Disp: , Rfl:     clorazepate (TRANXENE) 3.75 mg tablet, Take 3.75 mg by mouth as needed, Disp: , Rfl:     furosemide (LASIX) 10 mg/mL oral solution, Take 10 mg by mouth daily (Patient not taking: Reported on 7/3/2024), Disp: , Rfl:     ALLERGIES:  No Known Allergies    REVIEW OF SYSTEMS:  Pertinent items are noted in HPI.  A comprehensive review of systems was negative.    LABS:  HgA1c: No results found for: \"HGBA1C\"  BMP:   Lab Results   Component Value Date    CALCIUM 9.2 07/18/2023    K 4.0 07/18/2023    CO2 28 07/18/2023     07/18/2023    BUN 13 07/18/2023    CREATININE 0.68 07/18/2023         _____________________________________________________  PHYSICAL EXAMINATION:  Vital signs: Ht 5' 5\" (1.651 m)   Wt 84.2 kg (185 lb 9.6 oz)   BMI 30.89 kg/m²   General: well developed and well nourished, alert, oriented times 3, and appears comfortable  Psychiatric: Normal  HEENT: Trachea Midline, No torticollis  Cardiovascular: No discernable arrhythmia  Pulmonary: No wheezing or stridor  Abdomen: No rebound or guarding  Extremities: No peripheral edema  Skin: No masses, erythema, " lacerations, fluctation, ulcerations  Neurovascular: Sensation Intact to the Median, Ulnar, Radial Nerve, Motor Intact to the Median, Ulnar, Radial Nerve, and Pulses Intact    MUSCULOSKELETAL EXAMINATION:    LEFT SIDE: Carpal tunnel: No thenar or hypothenar atrophy, Positive tinel's over the wrist, APB 4/5, AIN 5/5, Intrinsics 5/5     RIGHT SIDE: Carpal tunnel: No thenar or hypothenar atrophy, Negative tinel's over the wrist, APB strength 4/5, AIN strength 5/5, intrinsics strength 5/5     _____________________________________________________  STUDIES REVIEWED:  Images were reviewed in PACS by Dr. Ellison and demonstrate: US bilateral wrists demonstrate bilateral carpal tunnel syndrome. Maximum median nerve cross sectional area within carpal tunnel (CSAc): 11.0 sq mm on the left. Maximum median nerve cross sectional area within carpal tunnel (CSAc): 9.0 sq mm on the right.       PROCEDURES PERFORMED:  Procedures  No Procedures performed today

## 2024-10-31 ENCOUNTER — HOSPITAL ENCOUNTER (OUTPATIENT)
Facility: HOSPITAL | Age: 69
Setting detail: OUTPATIENT SURGERY
Discharge: HOME | End: 2024-10-31
Attending: ORTHOPAEDIC SURGERY | Admitting: ORTHOPAEDIC SURGERY
Payer: MEDICARE

## 2024-10-31 VITALS
HEIGHT: 65 IN | SYSTOLIC BLOOD PRESSURE: 137 MMHG | HEART RATE: 92 BPM | WEIGHT: 189 LBS | TEMPERATURE: 97.1 F | OXYGEN SATURATION: 95 % | DIASTOLIC BLOOD PRESSURE: 63 MMHG | RESPIRATION RATE: 18 BRPM | BODY MASS INDEX: 31.49 KG/M2

## 2024-10-31 DIAGNOSIS — G56.02 LEFT CARPAL TUNNEL SYNDROME: Primary | ICD-10-CM

## 2024-10-31 PROCEDURE — 29848 WRIST ENDOSCOPY/SURGERY: CPT | Performed by: ORTHOPAEDIC SURGERY

## 2024-10-31 PROCEDURE — NC001 PR NO CHARGE: Performed by: ORTHOPAEDIC SURGERY

## 2024-10-31 RX ORDER — LIDOCAINE HYDROCHLORIDE AND EPINEPHRINE 10; 10 MG/ML; UG/ML
20 INJECTION, SOLUTION INFILTRATION; PERINEURAL ONCE
Status: COMPLETED | OUTPATIENT
Start: 2024-10-31 | End: 2024-10-31

## 2024-10-31 RX ORDER — TRAMADOL HYDROCHLORIDE 50 MG/1
50 TABLET ORAL EVERY 6 HOURS PRN
Status: CANCELLED | OUTPATIENT
Start: 2024-10-31

## 2024-10-31 RX ORDER — COVID-19 ANTIGEN TEST
220 KIT MISCELLANEOUS 2 TIMES DAILY
Qty: 60 CAPSULE | Refills: 0 | Status: SHIPPED | OUTPATIENT
Start: 2024-10-31 | End: 2024-11-30

## 2024-10-31 RX ORDER — SENNOSIDES 8.6 MG
650 CAPSULE ORAL EVERY 8 HOURS PRN
Qty: 30 TABLET | Refills: 0 | Status: SHIPPED | OUTPATIENT
Start: 2024-10-31

## 2024-10-31 RX ORDER — TRAMADOL HYDROCHLORIDE 50 MG/1
50 TABLET ORAL EVERY 6 HOURS PRN
Qty: 5 TABLET | Refills: 0 | Status: SHIPPED | OUTPATIENT
Start: 2024-10-31

## 2024-10-31 RX ORDER — ONDANSETRON 2 MG/ML
4 INJECTION INTRAMUSCULAR; INTRAVENOUS EVERY 6 HOURS PRN
Status: CANCELLED | OUTPATIENT
Start: 2024-10-31

## 2024-10-31 RX ORDER — ACETAMINOPHEN 325 MG/1
650 TABLET ORAL EVERY 6 HOURS PRN
Status: CANCELLED | OUTPATIENT
Start: 2024-10-31

## 2024-10-31 RX ADMIN — LIDOCAINE HYDROCHLORIDE,EPINEPHRINE BITARTRATE 18 ML: 10; .01 INJECTION, SOLUTION INFILTRATION; PERINEURAL at 08:35

## 2024-10-31 NOTE — H&P
Diagnosis: Left carpal tunnel syndrome    Procedure: Left endoscopic carpal tunnel release under local anesthesia

## 2024-10-31 NOTE — OP NOTE
OPERATIVE REPORT  PATIENT NAME: Adina Valle  :  1955  MRN: [id number]  Pt Location: [facility] OR    SURGERY DATE: 10/31/24    Surgeons and Role:     * Adam Ellison MD - Primary     * Javi Miller PA-C - Assisting     * Shadia Vivar MD    Pre-Op Diagnosis:  Carpal tunnel syndrome on left [G56.02]    Post-Op Diagnosis:  .Carpal tunnel syndrome on left [G56.02]    Procedure(s) (LRB):  Left endoscopic carpal tunnel release (Left)    Specimen(s):  No specimens collected during this procedure.    Estimated Blood Loss:   Minimal      Anesthesia Type:   Local    Operative Indications:  The patient has a history of Carpal Tunnel Syndrome  left that was recalcitrant to conservative management.  The decision was made to bring the patient to the operating room for Endoscopic Carpal Tunnel Release  left.  Risks of the procedure were explained which include, but are not limited to bleeding; infection; damage to nerves, arteries,veins, tendons; scar; pain; need for reoperation; failure to give desired result; and risks of anaesthesia.  All questions were answered to satisfaction and they were willing to proceed.         Operative Findings:  Left endoscopic carpal tunnel release    Complications:   None    Procedure and Technique:  After the patient, site, and procedure were identified, the patient was brought into the operating room in a supine position.  Local anaesthesia was adminstered in the preoperative holding area.  A tourniquet was not used.  The  left upper extremity was then prepped and drapped in a normal, sterile, orthopedic fashion.    After reconfirmation of the patient, site, and surgical procedure, which was agreed upon by the entire surgical team, attention was turned to the left wrist.  The sites of the proximal and distal incisions were marked.  The jean claude of the proximal incision was placed horizontally at the midline of the wrist.  The distal incision jean claude was longitudinal  extending distally from the point of intersection of the line between the long finger and ring finger and the line along the distal border of the fully abducted thumb.  The proximal incision was performed.  Subcutaneous tissues were dissected.  Then the transverse volar antebrachial fascia was perforated with a scalpel.  The edges of the skin incision where retracted and the forearm fascia was incised for approximately 1.5 cm proximally with care taken to identify and protect the median nerve.  Retractors were used to inspect the transverse carpal ligament distally.  A curved Howard dissector was used to glide under the transverse carpal ligament and superficial to the median nerve with confirmation via the washboard feeling.  Then the curved Howard was pushed into the palm toward the distal incision site.  When the location of the distal skin jean claude was adequate, the distal incision was made.  Then with retraction of the skin, further dissection and perforation of the palmar fascia was performed with the use of tenotomy scissors.  The curved Howard was guided from proximal to distal out the distal incisions without any twisting to allow for dilation of the tract.  The curved Howard was removed, and the cannula for the camera was inserted along the same tract, making sure to keep the alignment post on the cannula perpendicular to the plane of the hand without twisting.  Then while keeping the wrist in extension, and holding the cannula of the camera in place, the wrist was placed on the hyperextension board.  The scope was inserted distally, and a cotton-tip applicator was used proximally to clean the tract as well as the scope.  A curved cutting knife was introduced from proximal to distal while keeping visualization with the use of the camera.  Without twisting of the canula, the knife was used to cut the transverse carpal ligament completely, making sure there were no remnant fibers.  Then after this was accomplished, the  hand was removed from the extension block.  Three maneuvers were used to confirm the full release of the transverse carpal ligament.  First, the ease of twisting the trocar of the camera confirmed the release of the ligament.  Second, the curved Howard was introduced to make sure there were no remnant fibers that could be felt palmarly.  Third, the scope was introduced again to visualize that the whole ligament was released proximally to distally.  Additional confirmation of full release included retraction and inspection in the distal and proximal incisions to make sure there were no remnant fibers distally or proximally respectively.       At the completion of the procedure, hemostasis was obtained with cautery and direct pressure.  The wounds were copiously irrigated with sterile solution.  The wounds were closed with Prolene.  Sterile dressings were applied, including Xeroform, gauze, tweeners, webril, ACE.  Please note, all sponge, needle, and instrument counts were correct prior to closure.  Loupe magnification was utilized.  The patient tolerated the procedure well.     I was present for all critical portions of the procedure.    Patient Disposition:  PACU  and hemodynamically stable    SIGNATURE: Adam Ellison MD  DATE: 10/31/24  TIME: 9:44 AM

## 2024-11-11 ENCOUNTER — OFFICE VISIT (OUTPATIENT)
Dept: OBGYN CLINIC | Facility: CLINIC | Age: 69
End: 2024-11-11

## 2024-11-11 VITALS — BODY MASS INDEX: 31.49 KG/M2 | WEIGHT: 189 LBS | HEIGHT: 65 IN

## 2024-11-11 DIAGNOSIS — G56.02 CARPAL TUNNEL SYNDROME ON LEFT: Primary | ICD-10-CM

## 2024-11-11 PROCEDURE — 99024 POSTOP FOLLOW-UP VISIT: CPT | Performed by: ORTHOPAEDIC SURGERY

## 2024-11-11 NOTE — PROGRESS NOTES
"Assessment:   S/P Left endoscopic carpal tunnel release - Left on 10/31/2024    Plan:   Patient was advised to avoid soaking for 2 to 3 days to allow the incision to finish healing  Patient was advised that they can have palmar pain for 3 to 4 months after surgery which is normal  They was advised to use heat massage as demonstrated in the office  They will follow-up with us as needed    Follow Up:  PRN    To Do Next Visit:  Reevaluation      CHIEF COMPLAINT:  Chief Complaint   Patient presents with    Left Wrist - Post-op, Suture / Staple Removal     ECTR - 10/31/24         SUBJECTIVE:  Adina Valle is a 69 y.o. female who presents for follow up after Left endoscopic carpal tunnel release - Left on 10/31/2024.  Today patient has improvement in her numbness and tingling.       PHYSICAL EXAMINATION:  Vital signs: Ht 5' 5\" (1.651 m)   Wt 85.7 kg (189 lb)   BMI 31.45 kg/m²   General: well developed and well nourished, alert, oriented times 3, and appears comfortable  Psychiatric: Normal    MUSCULOSKELETAL EXAMINATION:  Incision: Clean, dry, intact  Range of Motion: As expected, opposition intact, and full composite fist possible  Neurovascular status: Neuro intact, good cap refill  Activity Restrictions: No restrictions  Done today: Sutures out      STUDIES REVIEWED:  No Studies to review      PROCEDURES PERFORMED:  Procedures  No Procedures performed today    "

## 2025-04-18 ENCOUNTER — TELEPHONE (OUTPATIENT)
Age: 70
End: 2025-04-18

## 2025-04-18 ENCOUNTER — PREP FOR PROCEDURE (OUTPATIENT)
Age: 70
End: 2025-04-18

## 2025-04-18 DIAGNOSIS — C18.9 ADENOCARCINOMA OF COLON (HCC): Primary | ICD-10-CM

## 2025-04-18 NOTE — LETTER
April 18, 2025                     Cellcept Pregnancy And Lactation Text: This medication is Pregnancy Category D and isn't considered safe during pregnancy. It is unknown if this medication is excreted in breast milk.

## 2025-04-18 NOTE — TELEPHONE ENCOUNTER
"Scheduled date of colonoscopy (as of today): 8/15/25  Physician performing colonoscopy: Paula  Location of colonoscopy: [facility]  Bowel prep reviewed with patient: Nicole/Kin  Instructions via MYC  Clearances: N/A     04/18/25  Screened by: Chayo Moreira MA    Referring Provider     Pre- Screening:  HT: 5'6\"  WT: 185   BMI: 29.9    Has patient been referred for a routine screening Colonoscopy? yes  Is the patient between 45-75 years old? yes      Previous Colonoscopy yes   If yes:    Date:  7/23/25    Facility: [facility]    Reason:         Does the patient want to see a Gastroenterologist prior to their procedure OR are they having any GI symptoms? no    Has the patient been hospitalized or had abdominal surgery in the past 6 months? no    Does the patient use supplemental oxygen? no    Does the patient take Coumadin, Lovenox, Plavix, Elliquis, Xarelto, or other blood thinning medication? no    Has the patient had a stroke, cardiac event, or stent placed in the past year? no      If patient is between 45yrs - 49yrs, please advise patient that we will have to confirm benefits & coverage with their insurance company for a routine screening colonoscopy.    "

## 2025-07-27 ENCOUNTER — APPOINTMENT (EMERGENCY)
Age: 70
End: 2025-07-27
Payer: MEDICARE

## 2025-07-27 ENCOUNTER — HOSPITAL ENCOUNTER (EMERGENCY)
Age: 70
Discharge: HOME | End: 2025-07-27
Attending: EMERGENCY MEDICINE | Admitting: EMERGENCY MEDICINE
Payer: MEDICARE

## 2025-07-27 VITALS
SYSTOLIC BLOOD PRESSURE: 140 MMHG | DIASTOLIC BLOOD PRESSURE: 86 MMHG | OXYGEN SATURATION: 99 % | BODY MASS INDEX: 30.44 KG/M2 | HEART RATE: 88 BPM | TEMPERATURE: 97.9 F | HEIGHT: 66 IN | RESPIRATION RATE: 16 BRPM | WEIGHT: 189.38 LBS

## 2025-07-27 DIAGNOSIS — K11.20 PAROTIDITIS: Primary | ICD-10-CM

## 2025-07-27 DIAGNOSIS — E87.6 HYPOKALEMIA: ICD-10-CM

## 2025-07-27 LAB
ALBUMIN SERPL BCG-MCNC: 4.1 G/DL (ref 3.5–5.2)
ALP SERPL-CCNC: 95 U/L (ref 35–160)
ALT SERPL W P-5'-P-CCNC: 23 U/L (ref 5–33)
ANION GAP SERPL CALCULATED.3IONS-SCNC: 12 MMOL/L (ref 7–16)
AST SERPL W P-5'-P-CCNC: 23 U/L (ref 5–32)
BASOPHILS # BLD AUTO: 0.06 THOUSANDS/ÂΜL (ref 0–0.1)
BASOPHILS NFR BLD AUTO: 1 % (ref 0–1)
BILIRUB SERPL-MCNC: 0.31 MG/DL (ref 0.2–1.2)
BUN SERPL-MCNC: 14 MG/DL (ref 8–23)
CALCIUM SERPL-MCNC: 9.1 MG/DL (ref 8.6–10.2)
CHLORIDE SERPL-SCNC: 108 MMOL/L (ref 98–107)
CO2 SERPL-SCNC: 22 MMOL/L (ref 22–29)
CREAT SERPL-MCNC: 0.61 MG/DL (ref 0.5–0.9)
EOSINOPHIL # BLD AUTO: 0.36 THOUSAND/ÂΜL (ref 0–0.61)
EOSINOPHIL NFR BLD AUTO: 6 % (ref 0–6)
ERYTHROCYTE [DISTWIDTH] IN BLOOD BY AUTOMATED COUNT: 12.2 % (ref 11.6–15.1)
GFR SERPL CREATININE-BSD FRML MDRD: 92 ML/MIN/1.73SQ M
GLUCOSE SERPL-MCNC: 92 MG/DL (ref 65–140)
HCT VFR BLD AUTO: 38.1 % (ref 34.8–46.1)
HGB BLD-MCNC: 13.5 G/DL (ref 11.5–15.4)
IMM GRANULOCYTES # BLD AUTO: 0.02 THOUSAND/UL (ref 0–0.2)
IMM GRANULOCYTES NFR BLD AUTO: 0 % (ref 0–2)
LYMPHOCYTES # BLD AUTO: 1.43 THOUSANDS/ÂΜL (ref 0.6–4.47)
LYMPHOCYTES NFR BLD AUTO: 23 % (ref 14–44)
MCH RBC QN AUTO: 31 PG (ref 26.8–34.3)
MCHC RBC AUTO-ENTMCNC: 35.4 G/DL (ref 31.4–37.4)
MCV RBC AUTO: 87 FL (ref 82–98)
MONOCYTES # BLD AUTO: 0.53 THOUSAND/ÂΜL (ref 0.17–1.22)
MONOCYTES NFR BLD AUTO: 8 % (ref 4–12)
NEUTROPHILS # BLD AUTO: 3.96 THOUSANDS/ÂΜL (ref 1.85–7.62)
NEUTS SEG NFR BLD AUTO: 62 % (ref 43–75)
NRBC BLD AUTO-RTO: 0 /100 WBCS
PLATELET # BLD AUTO: 221 THOUSANDS/UL (ref 149–390)
PMV BLD AUTO: 9.2 FL (ref 8.9–12.7)
POTASSIUM SERPL-SCNC: 3.4 MMOL/L (ref 3.5–5.1)
PROT SERPL-MCNC: 6.5 G/DL (ref 6.4–8.3)
RBC # BLD AUTO: 4.36 MILLION/UL (ref 3.81–5.12)
SODIUM SERPL-SCNC: 142 MMOL/L (ref 136–145)
WBC # BLD AUTO: 6.36 THOUSAND/UL (ref 4.31–10.16)

## 2025-07-27 PROCEDURE — 85025 COMPLETE CBC W/AUTO DIFF WBC: CPT | Performed by: PHYSICIAN ASSISTANT

## 2025-07-27 PROCEDURE — 80053 COMPREHEN METABOLIC PANEL: CPT | Performed by: PHYSICIAN ASSISTANT

## 2025-07-27 PROCEDURE — 70487 CT MAXILLOFACIAL W/DYE: CPT

## 2025-07-27 PROCEDURE — 99284 EMERGENCY DEPT VISIT MOD MDM: CPT

## 2025-07-27 RX ORDER — ACETAMINOPHEN 325 MG/1
650 TABLET ORAL ONCE
Status: COMPLETED | OUTPATIENT
Start: 2025-07-27 | End: 2025-07-27

## 2025-07-27 RX ORDER — LEVOTHYROXINE SODIUM 125 UG/1
125 TABLET ORAL
COMMUNITY
Start: 2025-07-15

## 2025-07-27 RX ORDER — POTASSIUM CHLORIDE 1500 MG/1
20 TABLET, EXTENDED RELEASE ORAL ONCE
Status: COMPLETED | OUTPATIENT
Start: 2025-07-27 | End: 2025-07-27

## 2025-07-27 RX ORDER — PITAVASTATIN CALCIUM 1.04 MG/1
1 TABLET, FILM COATED ORAL
COMMUNITY

## 2025-07-27 RX ORDER — VENLAFAXINE HYDROCHLORIDE 75 MG/1
1 CAPSULE, EXTENDED RELEASE ORAL DAILY
COMMUNITY
Start: 2025-07-07

## 2025-07-27 RX ORDER — TOPIRAMATE 25 MG/1
1 TABLET, FILM COATED ORAL 2 TIMES DAILY
COMMUNITY
Start: 2025-07-07

## 2025-07-27 RX ORDER — VENLAFAXINE HYDROCHLORIDE 37.5 MG/1
37.5 CAPSULE, EXTENDED RELEASE ORAL 3 TIMES DAILY
COMMUNITY
Start: 2025-07-07

## 2025-07-27 RX ORDER — ENALAPRIL MALEATE AND HYDROCHLOROTHIAZIDE 5; 12.5 MG/1; MG/1
1 TABLET ORAL DAILY
COMMUNITY

## 2025-07-27 RX ADMIN — POTASSIUM CHLORIDE 20 MEQ: 1500 TABLET, EXTENDED RELEASE ORAL at 22:07

## 2025-07-27 RX ADMIN — ACETAMINOPHEN 650 MG: 325 TABLET ORAL at 20:06

## 2025-07-27 RX ADMIN — IOHEXOL 75 ML: 350 INJECTION, SOLUTION INTRAVENOUS at 20:30

## 2025-07-27 RX ADMIN — AMOXICILLIN AND CLAVULANATE POTASSIUM 1 TABLET: 875; 125 TABLET, FILM COATED ORAL at 22:07

## 2025-07-30 ENCOUNTER — TELEPHONE (OUTPATIENT)
Dept: GASTROENTEROLOGY | Facility: CLINIC | Age: 70
End: 2025-07-30

## 2025-08-14 PROBLEM — G56.00 CARPAL TUNNEL SYNDROME: Status: ACTIVE | Noted: 2025-08-14

## 2025-08-14 PROBLEM — C73 MALIGNANT NEOPLASM OF THYROID GLAND (HCC): Status: ACTIVE | Noted: 2025-08-14

## 2025-08-14 PROBLEM — F41.9 ANXIETY DISORDER: Status: ACTIVE | Noted: 2025-08-14

## 2025-08-14 PROBLEM — E04.2 NONTOXIC MULTINODULAR GOITER: Status: ACTIVE | Noted: 2025-08-14

## 2025-08-14 PROBLEM — C18.7 MALIGNANT NEOPLASM OF SIGMOID COLON (HCC): Status: ACTIVE | Noted: 2025-08-14

## 2025-08-14 PROBLEM — M19.90 OSTEOARTHRITIS: Status: ACTIVE | Noted: 2025-08-14

## 2025-08-14 PROBLEM — Z78.9 STATIN INTOLERANCE: Status: ACTIVE | Noted: 2025-08-14

## 2025-08-15 ENCOUNTER — HOSPITAL ENCOUNTER (OUTPATIENT)
Age: 70
Setting detail: OUTPATIENT SURGERY
Discharge: HOME | End: 2025-08-15
Attending: INTERNAL MEDICINE
Payer: MEDICARE

## 2025-08-15 ENCOUNTER — ANESTHESIA EVENT (OUTPATIENT)
Age: 70
End: 2025-08-15
Payer: MEDICARE

## 2025-08-15 ENCOUNTER — ANESTHESIA (OUTPATIENT)
Age: 70
End: 2025-08-15
Payer: MEDICARE

## 2025-08-15 VITALS
RESPIRATION RATE: 18 BRPM | HEIGHT: 66 IN | OXYGEN SATURATION: 96 % | BODY MASS INDEX: 29.16 KG/M2 | WEIGHT: 181.44 LBS | SYSTOLIC BLOOD PRESSURE: 139 MMHG | DIASTOLIC BLOOD PRESSURE: 90 MMHG | HEART RATE: 90 BPM | TEMPERATURE: 96.7 F

## 2025-08-15 DIAGNOSIS — C18.9 ADENOCARCINOMA OF COLON (HCC): ICD-10-CM

## 2025-08-15 PROCEDURE — 45380 COLONOSCOPY AND BIOPSY: CPT | Performed by: STUDENT IN AN ORGANIZED HEALTH CARE EDUCATION/TRAINING PROGRAM

## 2025-08-15 PROCEDURE — 88305 TISSUE EXAM BY PATHOLOGIST: CPT | Performed by: PATHOLOGY

## 2025-08-15 RX ORDER — SODIUM CHLORIDE, SODIUM LACTATE, POTASSIUM CHLORIDE, CALCIUM CHLORIDE 600; 310; 30; 20 MG/100ML; MG/100ML; MG/100ML; MG/100ML
25 INJECTION, SOLUTION INTRAVENOUS CONTINUOUS
Status: DISCONTINUED | OUTPATIENT
Start: 2025-08-15 | End: 2025-08-19 | Stop reason: HOSPADM

## 2025-08-15 RX ADMIN — SODIUM CHLORIDE, SODIUM LACTATE, POTASSIUM CHLORIDE, AND CALCIUM CHLORIDE 25 ML/HR: 600; 310; 30; 20 INJECTION, SOLUTION INTRAVENOUS at 07:35

## 2025-08-21 PROCEDURE — 88305 TISSUE EXAM BY PATHOLOGIST: CPT | Performed by: PATHOLOGY

## (undated) DEVICE — WET SKIN PREP TRAY: Brand: MEDLINE INDUSTRIES, INC.

## (undated) DEVICE — SUT PROLENE 4-0 PC-3 18 IN 8634G

## (undated) DEVICE — STERILE BETHLEHEM PLASTIC HAND: Brand: CARDINAL HEALTH

## (undated) DEVICE — GLOVE SRG BIOGEL 8

## (undated) DEVICE — RETROGRADE KNIFE BOX OF 6: Brand: ECTRA

## (undated) DEVICE — GLOVE SRG BIOGEL 7

## (undated) DEVICE — STERILE COTTON TIPPED APPLICATORS: Brand: PURITAN

## (undated) DEVICE — GLOVE INDICATOR PI UNDERGLOVE SZ 7 BLUE

## (undated) DEVICE — GLOVE INDICATOR PI UNDERGLOVE SZ 8 BLUE

## (undated) DEVICE — INTENDED FOR TISSUE SEPARATION, AND OTHER PROCEDURES THAT REQUIRE A SHARP SURGICAL BLADE TO PUNCTURE OR CUT.: Brand: BARD-PARKER ® CARBON RIB-BACK BLADES

## (undated) DEVICE — GLOVE SRG BIOGEL 7.5